# Patient Record
Sex: FEMALE | Race: WHITE | Employment: UNEMPLOYED | URBAN - METROPOLITAN AREA
[De-identification: names, ages, dates, MRNs, and addresses within clinical notes are randomized per-mention and may not be internally consistent; named-entity substitution may affect disease eponyms.]

---

## 2020-09-04 ENCOUNTER — HOSPITAL ENCOUNTER (OUTPATIENT)
Age: 60
Discharge: HOME | End: 2020-09-06

## 2020-09-04 PROCEDURE — 88304 TISSUE EXAM BY PATHOLOGIST: CPT

## 2020-09-04 PROCEDURE — 88311 DECALCIFY TISSUE: CPT

## 2021-01-17 NOTE — PROGRESS NOTES
[address] [facility] Cardiology consult  Dr. Joan Mitchell      Reason for Consult: Abnormal EKG  Referring Physician: Renee Parks DO     CHIEF COMPLAINT:   Chief Complaint   Patient presents with    Abnormal Test Results     new patient- abnormal ekg- pt has complaints of palpitations, sob       HISTORY OF PRESENT ILLNESS:   62year old female with history of TIA, palpitations, familial hypercholesterolemia with family history for premature coronary artery disease, is here for evaluation of an abnormal EKG. Patient denies any chest pain, no shortness of breath, no lightheadedness, no dizziness, no palpitations, no pedal edema, no PND, no orthopnea, no syncope, no presyncopal episodes.   Able to workout 30 minutes on a treadmill without any cardiac complaints    Past Medical History:   Diagnosis Date    Anxiety     Atypical chest pain     Blood transfusion     Chronic fatigue syndrome     Depression     Fibromyositis     Gall stones     H/O cardiovascular stress test 4/23/12    [facility]     Hiatal hernia     IBS (irritable bowel syndrome)     Mitral valve prolapse     Nausea & vomiting     Palpitations     Peptic ulcer     Schatzki's ring 2013    diganosed by dr Breanna James has been having endoscopies done for this    TIA (transient ischemic attack)     diagnosed in [address] [place] [address] Unspecified cerebral artery occlusion with cerebral infarction          Past Surgical History:   Procedure Laterality Date    APPENDECTOMY      ARTERY BIOPSY      left temporal BX 3/6/2012    BICEPS TENDON REPAIR  may 2012    left shoulder tendon repair by dr Amanda Morrow at [address] Bilateral aug 2013    diana breast augmentation    ENDOSCOPY, COLON, DIAGNOSTIC      HYSTERECTOMY  2008    partial hystecotomy    SHOULDER ARTHROSCOPY  may 2012    rotator cuff repair subacromial decompression and debridment    UPPER GASTROINTESTINAL ENDOSCOPY      esophageal stricture    UPPER GASTROINTESTINAL ENDOSCOPY  october 2012 activity     Days per week: Not on file     Minutes per session: Not on file    Stress: Not on file   Relationships    Social connections     Talks on phone: Not on file     Gets together: Not on file     Attends Oriental orthodox service: Not on file     Active member of club or organization: Not on file     Attends meetings of clubs or organizations: Not on file     Relationship status: Not on file    Intimate partner violence     Fear of current or ex partner: Not on file     Emotionally abused: Not on file     Physically abused: Not on file     Forced sexual activity: Not on file   Other Topics Concern    Not on file   Social History Narrative    Not on file       Family History   Problem Relation Age of Onset    Stroke Mother     Heart Disease Mother     High Cholesterol Mother     Heart Attack Mother     Cancer Father     Heart Disease Father     Heart Surgery Father     High Cholesterol Sister        REVIEW OF SYSTEMS:     CONSTITUTIONAL:  negative for  fevers, chills, sweats and fatigue  EYES:  negative for  double vision, blurred vision and blind spots  HEENT:  negative for  tinnitus, earaches, nasal congestion and epistaxis  RESPIRATORY:  negative for  dry cough, cough with sputum, wheezing and hemoptysis  CARDIOVASCULAR: as per HPI  GASTROINTESTINAL:  negative for nausea, vomiting, diarrhea, constipation, pruritus and jaundice  GENITOURINARY:  negative for frequency, dysuria, nocturia, urinary incontinence and hesitancy  HEMATOLOGIC/LYMPHATIC:  negative for easy bruising, bleeding, lymphadenopathy and petechiae  ALLERGIC/IMMUNOLOGIC:  negative for urticaria, hay fever and angioedema  ENDOCRINE:  negative for heat intolerance, cold intolerance, tremor, hair loss and diabetic symptoms including neither polyuria nor polydipsia nor blurred vision  MUSCULOSKELETAL:  negative for  myalgias, arthralgias, joint swelling, stiff joints and decreased range of motion  NEUROLOGICAL:  negative for memory problems, speech problems, visual disturbance, dysphagia, weakness and numbness      PHYSICAL EXAM:   CONSTITUTIONAL:  awake, alert, cooperative, no apparent distress, and appears stated age  EYES:  lids and lashes normal and pupils equal, round and reactive to light, anicteric sclerae  HEAD:  normocepalic, without obvious abnormality, atraumatic, pink, moist mucous membranes. NECK:  Supple, symmetrical, trachea midline, no adenopathy, thyroid symmetric, not enlarged and no tenderness, skin normal  HEMATOLOGIC/LYMPHATICS:  no cervical lymphadenopathy and no supraclavicular lymphadenopathy  LUNGS:  No increased work of breathing, good air exchange, clear to auscultation bilaterally, no crackles or wheezing  CARDIOVASCULAR:  Normal apical impulse, regular rate and rhythm, normal S1 and S2, no S3 or S4, and no murmur noted and no JVD, no carotid bruit, no pedal edema, good carotid upstroke bilaterally. ABDOMEN:  Soft, nontender, no masses, no hepatomegaly or splenomegaly, BS+  CHEST: nontender to palpation, expands symmetrically  MUSCULOSKELETAL:  No clubbing no cyanosis. there is no redness, warmth, or swelling of the joints  full range of motion noted  NEUROLOGIC:  Alert, awake,oriented x3, no focal neurologic deficit was appreciated  SKIN:  no bruising or bleeding, normal skin color, texture, turgor and no redness, warmth, or swelling        /80   Pulse 68   Resp 18   Ht 5' 1\" (1.549 m)   Wt 120 lb (54.4 kg)   BMI 22.67 kg/m²     DATA:   I personally reviewed the visit EKG with the following interpretation: Sinus rhythm, T wave changes in the septal leads    ECHO:   Stress Test:  Angiography:  Cardiology Labs: BMP:    Lab Results   Component Value Date     08/14/2013    K 4.4 08/14/2013     08/14/2013    CO2 33 08/14/2013    BUN 9 08/14/2013    CREATININE 0.9 08/14/2013     CMP:    Lab Results   Component Value Date     08/14/2013    K 4.4 08/14/2013     08/14/2013    CO2 33 08/14/2013 errors may be present

## 2021-01-20 ENCOUNTER — OFFICE VISIT (OUTPATIENT)
Dept: CARDIOLOGY CLINIC | Age: 61
End: 2021-01-20
Payer: OTHER GOVERNMENT

## 2021-01-20 VITALS
HEIGHT: 61 IN | BODY MASS INDEX: 22.66 KG/M2 | SYSTOLIC BLOOD PRESSURE: 134 MMHG | RESPIRATION RATE: 18 BRPM | HEART RATE: 68 BPM | WEIGHT: 120 LBS | DIASTOLIC BLOOD PRESSURE: 80 MMHG

## 2021-01-20 DIAGNOSIS — R94.31 ABNORMAL EKG: Primary | ICD-10-CM

## 2021-01-20 PROCEDURE — 93000 ELECTROCARDIOGRAM COMPLETE: CPT | Performed by: INTERNAL MEDICINE

## 2021-01-20 PROCEDURE — 99243 OFF/OP CNSLTJ NEW/EST LOW 30: CPT | Performed by: INTERNAL MEDICINE

## 2021-01-20 RX ORDER — MULTIVIT-MIN/IRON/FOLIC ACID/K 18-600-40
500 CAPSULE ORAL DAILY
COMMUNITY

## 2021-01-20 RX ORDER — MULTIVIT-MIN/IRON/FOLIC ACID/K 18-600-40
2000 CAPSULE ORAL DAILY
COMMUNITY

## 2021-01-20 RX ORDER — METOPROLOL SUCCINATE 25 MG/1
25 TABLET, EXTENDED RELEASE ORAL DAILY
COMMUNITY
Start: 2020-12-02

## 2022-01-10 ENCOUNTER — OFFICE VISIT (OUTPATIENT)
Dept: FAMILY MEDICINE CLINIC | Age: 62
End: 2022-01-10
Payer: OTHER GOVERNMENT

## 2022-01-10 VITALS
TEMPERATURE: 98.5 F | HEIGHT: 61 IN | SYSTOLIC BLOOD PRESSURE: 130 MMHG | BODY MASS INDEX: 22.47 KG/M2 | OXYGEN SATURATION: 99 % | WEIGHT: 119 LBS | RESPIRATION RATE: 18 BRPM | HEART RATE: 80 BPM | DIASTOLIC BLOOD PRESSURE: 78 MMHG

## 2022-01-10 DIAGNOSIS — E03.9 HYPOTHYROIDISM, UNSPECIFIED TYPE: ICD-10-CM

## 2022-01-10 DIAGNOSIS — Z12.31 BREAST CANCER SCREENING BY MAMMOGRAM: ICD-10-CM

## 2022-01-10 DIAGNOSIS — Z00.00 ROUTINE PHYSICAL EXAMINATION: Primary | ICD-10-CM

## 2022-01-10 DIAGNOSIS — Z28.21 VACCINATION NOT CARRIED OUT BECAUSE OF PATIENT REFUSAL: ICD-10-CM

## 2022-01-10 DIAGNOSIS — Z83.438 FAMILY HISTORY OF HYPERLIPIDEMIA: ICD-10-CM

## 2022-01-10 PROCEDURE — 99203 OFFICE O/P NEW LOW 30 MIN: CPT | Performed by: STUDENT IN AN ORGANIZED HEALTH CARE EDUCATION/TRAINING PROGRAM

## 2022-01-10 RX ORDER — FLUTICASONE PROPIONATE 50 MCG
2 SPRAY, SUSPENSION (ML) NASAL DAILY
COMMUNITY

## 2022-01-10 RX ORDER — CYANOCOBALAMIN 1000 UG/ML
INJECTION INTRAMUSCULAR; SUBCUTANEOUS
COMMUNITY
Start: 2021-11-04

## 2022-01-10 RX ORDER — LORAZEPAM 0.5 MG/1
0.5 TABLET ORAL 3 TIMES DAILY PRN
COMMUNITY

## 2022-01-10 SDOH — ECONOMIC STABILITY: FOOD INSECURITY: WITHIN THE PAST 12 MONTHS, THE FOOD YOU BOUGHT JUST DIDN'T LAST AND YOU DIDN'T HAVE MONEY TO GET MORE.: NEVER TRUE

## 2022-01-10 SDOH — ECONOMIC STABILITY: FOOD INSECURITY: WITHIN THE PAST 12 MONTHS, YOU WORRIED THAT YOUR FOOD WOULD RUN OUT BEFORE YOU GOT MONEY TO BUY MORE.: NEVER TRUE

## 2022-01-10 ASSESSMENT — ENCOUNTER SYMPTOMS
SINUS PRESSURE: 0
NAUSEA: 1
BACK PAIN: 0
COUGH: 0
SHORTNESS OF BREATH: 0
DIARRHEA: 0
RHINORRHEA: 0
VOMITING: 0
CONSTIPATION: 0
SINUS PAIN: 0
ABDOMINAL PAIN: 0
EYE PAIN: 0
EYE REDNESS: 0
SORE THROAT: 0

## 2022-01-10 ASSESSMENT — PATIENT HEALTH QUESTIONNAIRE - PHQ9
2. FEELING DOWN, DEPRESSED OR HOPELESS: 1
SUM OF ALL RESPONSES TO PHQ9 QUESTIONS 1 & 2: 2
SUM OF ALL RESPONSES TO PHQ QUESTIONS 1-9: 2
1. LITTLE INTEREST OR PLEASURE IN DOING THINGS: 1
SUM OF ALL RESPONSES TO PHQ QUESTIONS 1-9: 2

## 2022-01-10 ASSESSMENT — SOCIAL DETERMINANTS OF HEALTH (SDOH): HOW HARD IS IT FOR YOU TO PAY FOR THE VERY BASICS LIKE FOOD, HOUSING, MEDICAL CARE, AND HEATING?: NOT HARD AT ALL

## 2022-01-10 NOTE — PROGRESS NOTES
1/10/2022    Kam Gordillo (:  1960) is a 62 y.o. female, here for evaluation of the following medical concerns:    HPI  Chief Complaint   Patient presents with    New Patient     est pcp    Advice Only     Questions re: triazalom from dentist / Is it ok to take (from Dentist)     Patient is a 26-year-old female here today to establish care with myself. She has a past medical history of anxiety depression chronic fatigue fibromyositis history of gallstones hiatal hernia and bowel syndrome mitral valve prolapse nausea and vomiting palpitations peptic ulcers schatzis ring TIA and a cerebral artery occlusion. She is allergic to codeine. Surgical history includes appendectomy and artery biopsy a biceps tendon repair per surgery hysterectomy shoulder arthroscopy multiple upper GI scopes. Patient is getting dental work done on the . The dental center and triazolam however she is already on lorazepam.  She has had a refill of this since 2018 and barely takes it. She has many pills still left. Therefore she takes it very infrequently. She is also on tramadol but takes it due to her pain in her hands. Again she only takes this as needed and does not need it all the time. Discussed that we would send in 1 and if we need to in the future we can refer to pain management or psychiatry. Patient states that she always has nausea. She has headaches on and off from her left shoulder pain. She does have numbness in her hands and chronic hand pain. She always has depression anxiety. However she is sleeping well. She denies any suicidal homicidal thoughts. Patient does decline the flu shot today. She is going to go get her Covid shot in the future. She will ask her insurance about where she should get the shingles vaccine. Review of Systems   Constitutional: Negative for chills, fatigue and fever.    HENT: Negative for congestion, ear pain, postnasal drip, rhinorrhea, sinus pressure, sinus pain and sore throat. Eyes: Negative for pain and redness. Respiratory: Negative for cough and shortness of breath. Cardiovascular: Negative for chest pain. Gastrointestinal: Positive for nausea. Negative for abdominal pain, constipation, diarrhea and vomiting. Genitourinary: Negative for difficulty urinating and dysuria. Musculoskeletal: Positive for arthralgias (shoulder pain and hand pain). Negative for back pain, myalgias and neck pain. Skin: Negative for rash. Neurological: Positive for numbness (left hand) and headaches (on and off from shoulder). Negative for dizziness and light-headedness. Psychiatric/Behavioral: Positive for dysphoric mood. Negative for sleep disturbance and suicidal ideas. The patient is nervous/anxious. Prior to Visit Medications    Medication Sig Taking? Authorizing Provider   LORazepam (ATIVAN) 0.5 MG tablet Take 0.5 mg by mouth 3 times daily as needed for Anxiety. Yes Historical Provider, MD   ALBUTEROL SULFATE ER PO Take by mouth Yes Historical Provider, MD   fluticasone (FLONASE) 50 MCG/ACT nasal spray 2 sprays by Each Nostril route daily Yes Historical Provider, MD   cyanocobalamin 1000 MCG/ML injection INJECT 1 ML INTO THE MUSCLE EVERY MONTH Yes Historical Provider, MD   metoprolol succinate (TOPROL XL) 25 MG extended release tablet 25 mg daily  Yes Historical Provider, MD   Cholecalciferol (VITAMIN D) 50 MCG (2000 UT) CAPS capsule Take 2,000 Units by mouth daily Yes Historical Provider, MD   Ascorbic Acid (VITAMIN C) 500 MG CAPS Take 500 mg by mouth daily Yes Historical Provider, MD   traMADol (ULTRAM) 50 MG tablet Take 50 mg by mouth every 6 hours as needed for Pain . Yes Historical Provider, MD   aspirin 81 MG tablet Take 81 mg by mouth daily. Yes Historical Provider, MD   Calcium-Magnesium 500-250 MG TABS Take 1 tablet by mouth daily.  1000mg/400 mg/zinc 15 mg Yes Historical Provider, MD   Bisacodyl (DULCOLAX PO) Take 1 tablet by mouth as needed. Yes Historical Provider, MD   therapeutic multivitamin-minerals (THERAGRAN-M) tablet Take 1 tablet by mouth daily. Yes Historical Provider, MD   Omeprazole (PRILOSEC PO) Take 20 mg by mouth daily. At noon Yes Historical Provider, MD        Allergies   Allergen Reactions    Codeine      dizziness       Past Medical History:   Diagnosis Date    Anxiety     Atypical chest pain     Blood transfusion     Bronchitis     chronic    Chronic fatigue syndrome     Depression     Fibromyositis     Gall stones     H/O cardiovascular stress test 4/23/12    [facility]     Hiatal hernia     IBS (irritable bowel syndrome)     Mitral valve prolapse     Nausea & vomiting     Palpitations     Peptic ulcer     Schatzki's ring 2013    diganosed by dr Mary Ann Bynum has been having endoscopies done for this    TIA (transient ischemic attack)     diagnosed in [address] [place] [address] cerebral artery occlusion with cerebral infarction        Past Surgical History:   Procedure Laterality Date    APPENDECTOMY      ARTERY BIOPSY      left temporal BX 3/6/2012    BICEPS TENDON REPAIR  may 2012    [facility] [address] [facility] 2013    diana breast augmentation    ENDOSCOPY, COLON, DIAGNOSTIC      HYSTERECTOMY  2008    partial hystecotomy    SHOULDER ARTHROSCOPY  may 2012    rotator cuff repair subacromial decompression and debridment    UPPER GASTROINTESTINAL ENDOSCOPY      esophageal stricture    UPPER GASTROINTESTINAL ENDOSCOPY  october 2012    x2 by dr Mary Ann Bynum due to questionable \"blood clot\" in throat.  Biopsy was performed patient is not seeing him again until December    UPPER GASTROINTESTINAL ENDOSCOPY         Social History     Socioeconomic History    Marital status:      Spouse name: Not on file    Number of children: Not on file    Years of education: Not on file    Highest education level: Not on file   Occupational History    Not on file   Tobacco Use    Smoking status: Never Smoker    Smokeless tobacco: Never Used   Vaping Use    Vaping Use: Never used   Substance and Sexual Activity    Alcohol use: No    Drug use: No    Sexual activity: Yes     Partners: Male   Other Topics Concern    Not on file   Social History Narrative    Not on file     Social Determinants of Health     Financial Resource Strain: Low Risk     Difficulty of Paying Living Expenses: Not hard at all   Food Insecurity: No Food Insecurity    Worried About [address] in the Last Year: Never true    [facility] in the Last Year: Never true   Transportation Needs:     Lack of Transportation (Medical): Not on file    Lack of Transportation (Non-Medical):  Not on file   Physical Activity:     Days of Exercise per Week: Not on file    Minutes of Exercise per Session: Not on file   Stress:     Feeling of Stress : Not on file   Social Connections:     Frequency of Communication with Friends and Family: Not on file    Frequency of Social Gatherings with Friends and Family: Not on file    Attends Bahai Services: Not on file    Active Member of [address] [place] [address] or Organizations: Not on file    Attends Club or Organization Meetings: Not on file    Marital Status: Not on file   Intimate Partner Violence:     Fear of Current or Ex-Partner: Not on file    Emotionally Abused: Not on file    Physically Abused: Not on file    Sexually Abused: Not on file   Housing Stability:     Unable to Pay for Housing in the Last Year: Not on file    Number of [place] in the Last Year: Not on file    Unstable Housing in the Last Year: Not on file        Family History   Problem Relation Age of Onset    Stroke Mother     Heart Disease Mother     High Cholesterol Mother     Heart Attack Mother     Cancer Father     Heart Disease Father     Heart Surgery Father     High Cholesterol Sister            Vitals:    01/10/22 0924   BP: 130/78   Pulse: 80   Resp: 18 Temp: 98.5 °F (36.9 °C)   TempSrc: Temporal   SpO2: 99%   Weight: 119 lb (54 kg)   Height: 5' 1\" (1.549 m)     Estimated body mass index is 22.48 kg/m² as calculated from the following:    Height as of this encounter: 5' 1\" (1.549 m). Weight as of this encounter: 119 lb (54 kg).     Most Recent Labs  CBC  Lab Results   Component Value Date    WBC 4.7 08/14/2013    WBC 5.9 09/24/2012    WBC 5.2 05/31/2012    RBC 4.17 08/14/2013    RBC 4.05 09/24/2012    RBC 3.92 05/31/2012    HGB 12.7 08/14/2013    HGB 12.6 09/24/2012    HGB 12.4 05/31/2012    HGB 11.0 05/03/2012    HGB 12.7 04/26/2012    HCT 36.6 08/14/2013    HCT 36.2 09/24/2012    HCT 35.0 05/31/2012    HCT 31.9 05/03/2012    HCT 36.5 04/26/2012    MCV 87.7 08/14/2013    MCV 89.4 09/24/2012    MCV 89.4 05/31/2012     08/14/2013     09/24/2012     05/31/2012      CMP  Lab Results   Component Value Date     08/14/2013     09/24/2012     05/31/2012    K 4.4 08/14/2013    K 4.6 09/24/2012    K 4.6 05/31/2012     08/14/2013     09/24/2012     05/31/2012    CO2 33 08/14/2013    CO2 32 09/24/2012    CO2 31 05/31/2012    GLUCOSE 104 08/14/2013    GLUCOSE 73 09/24/2012    GLUCOSE 83 05/31/2012    GLUCOSE 104 05/03/2012    GLUCOSE 91 04/26/2012    BUN 9 08/14/2013    BUN 10 09/24/2012    BUN 8 05/31/2012    BUN 8 05/03/2012    BUN 11 04/26/2012    CREATININE 0.9 08/14/2013    CREATININE 0.8 09/24/2012    CREATININE 0.8 05/31/2012    CREATININE 0.7 05/03/2012    CREATININE 0.7 04/26/2012    LABGLOM >60 08/14/2013    LABGLOM >60 09/24/2012    LABGLOM >60 05/31/2012    CALCIUM 10.0 08/14/2013    CALCIUM 9.8 09/24/2012    CALCIUM 9.8 05/31/2012    PROT 7.3 09/24/2012    PROT 7.3 05/31/2012    PROT 7.9 01/18/2012    LABALBU 4.6 09/24/2012    LABALBU 4.5 05/31/2012    LABALBU 4.6 01/18/2012    LABALBU 4.4 09/08/2011    BILITOT 0.4 09/24/2012    BILITOT 0.4 05/31/2012    BILITOT 0.6 01/18/2012    BILITOT 0.6 09/08/2011 warm and dry. Capillary Refill: Capillary refill takes less than 2 seconds. Neurological:      General: No focal deficit present. Mental Status: She is alert and oriented to person, place, and time. Psychiatric:         Mood and Affect: Mood normal.         Behavior: Behavior normal.         Thought Content: Thought content normal.         ASSESSMENT/PLAN:  Juan Flores was seen today for new patient and advice only. Diagnoses and all orders for this visit:    Routine physical examination  -     CBC Auto Differential; Future  -     Comprehensive Metabolic Panel; Future  -     Hemoglobin A1C; Future  -     TSH without Reflex; Future  -     Lipid Panel; Future    Hypothyroidism, unspecified type  -     TSH without Reflex; Future    Family history of hyperlipidemia  -     Lipid Panel; Future    Breast cancer screening by mammogram  -     BALDEMAR NATHAN DIGITAL SCREEN BILATERAL; Future    Vaccination not carried out because of patient refusal       Patient declined flu shot  Patient states that she is up-to-date on her colonoscopies and follows with a GI physician. Told her to let us get her records so we can update her in the chart. As above. Call or go to the nearest ED immediately if symptoms worsen or persist.  Educational materials and/or home exercises printed for patient's review and were included in patient instructions on his/her After Visit Summary and given to patient at the end of visit. Counseled regarding above diagnosis, including possible risks and complications,  especially if left uncontrolled. Counseled regarding the possible side effects, risks, benefits and alternatives to treatment; patient and/or guardian verbalizes understanding, agrees, feels comfortable with and wishes to proceed with above treatment plan.      Advised patient to call with any new medication issues, and read all Rx info from pharmacy to assure aware of all possible risks and side effects of medication before taking. Reviewed age and gender appropriate health screening exams and vaccinations. Advised patient regarding importance of keeping up with recommended health maintenance and to schedule as soon as possible if overdue, as this is important in assessing for undiagnosed pathology, especially cancer, as well as protecting against potentially harmful/life threatening disease. Patient and/or guardian verbalizes understanding and agrees with above counseling, assessment and plan. All questions answered. Return in about 4 weeks (around 2/7/2022) for med refill. An  electronic signature was used to authenticate this note.     --Magdiel Castellano,  on 1/10/2022 at 9:49 AM

## 2022-06-20 NOTE — PROGRESS NOTES
[facility] Cardiology consult  Dr. Soundra Opitz      Reason for Consult: Abnormal EKG  Referring Physician: Vern Zamora DO     CHIEF COMPLAINT:   Chief Complaint   Patient presents with    Heart Problem     Annual, patient has been having chest pains on and off       HISTORY OF PRESENT ILLNESS:   62year old female with history of TIA, palpitations, familial hypercholesterolemia with family history for premature coronary artery disease, is here for evaluation of an abnormal EKG. Chest pain, left-sided, sharp in nature, comes and goes, nonexertional, occasional palpitations, no shortness of breath, no lightheadedness, no dizziness, no pedal edema, no PND, no orthopnea, no syncope, no presyncopal episodes.   Functional capacity is at baseline    Past Medical History:   Diagnosis Date    Anxiety     Atypical chest pain     Blood transfusion     Bronchitis     chronic    Chronic fatigue syndrome     Depression     Fibromyositis     Gall stones     H/O cardiovascular stress test 4/23/12    [facility]     Hiatal hernia     IBS (irritable bowel syndrome)     Mitral valve prolapse     Nausea & vomiting     Palpitations     Peptic ulcer     Schatzki's ring 2013    diganosed by dr Shilo Marie has been having endoscopies done for this    TIA (transient ischemic attack)     diagnosed in [address] [place] [address]- cerebral artery occlusion with cerebral infarction          Past Surgical History:   Procedure Laterality Date    APPENDECTOMY      ARTERY BIOPSY      left temporal BX 3/6/2012    BICEPS TENDON REPAIR  may 2012    left shoulder tendon repair by dr Elisa Ac[facility] aug 2013    diana breast augmentation    ENDOSCOPY, COLON, DIAGNOSTIC      HYSTERECTOMY (CERVIX STATUS UNKNOWN)  2008    partial hystecotomy    SHOULDER ARTHROSCOPY  may 2012    rotator cuff repair subacromial decompression and debridment    UPPER GASTROINTESTINAL ENDOSCOPY      esophageal stricture    UPPER GASTROINTESTINAL ENDOSCOPY  october 2012    x2 by dr Monica Dickerson due to questionable \"blood clot\" in throat. Biopsy was performed patient is not seeing him again until December    UPPER GASTROINTESTINAL ENDOSCOPY           Current Outpatient Medications   Medication Sig Dispense Refill    LORazepam (ATIVAN) 0.5 MG tablet Take 0.5 mg by mouth 3 times daily as needed for Anxiety.  ALBUTEROL SULFATE ER PO Take by mouth      fluticasone (FLONASE) 50 MCG/ACT nasal spray 2 sprays by Each Nostril route daily      cyanocobalamin 1000 MCG/ML injection INJECT 1 ML INTO THE MUSCLE EVERY MONTH      metoprolol succinate (TOPROL XL) 25 MG extended release tablet 25 mg daily       Cholecalciferol (VITAMIN D) 50 MCG (2000 UT) CAPS capsule Take 2,000 Units by mouth daily      Ascorbic Acid (VITAMIN C) 500 MG CAPS Take 500 mg by mouth daily      traMADol (ULTRAM) 50 MG tablet Take 50 mg by mouth every 6 hours as needed for Pain .  aspirin 81 MG tablet Take 81 mg by mouth daily.  Calcium-Magnesium 500-250 MG TABS Take 1 tablet by mouth daily. 1000mg/400 mg/zinc 15 mg      Bisacodyl (DULCOLAX PO) Take 1 tablet by mouth as needed.  therapeutic multivitamin-minerals (THERAGRAN-M) tablet Take 1 tablet by mouth daily.  Omeprazole (PRILOSEC PO) Take 40 mg by mouth daily At noon       No current facility-administered medications for this visit.          Allergies as of 06/21/2022 - Fully Reviewed 01/10/2022   Allergen Reaction Noted    Codeine  02/22/2012       Social History     Socioeconomic History    Marital status:      Spouse name: Not on file    Number of children: Not on file    Years of education: Not on file    Highest education level: Not on file   Occupational History    Not on file   Tobacco Use    Smoking status: Never Smoker    Smokeless tobacco: Never Used   Vaping Use    Vaping Use: Never used   Substance and Sexual Activity    Alcohol use: No    Drug use: No    Sexual activity: Yes     Partners: Male   Other Topics Concern    Not on file   Social History Narrative    Not on file     Social Determinants of Health     Financial Resource Strain: Low Risk     Difficulty of Paying Living Expenses: Not hard at all   Food Insecurity: No Food Insecurity    Worried About Running Out of Food in the Last Year: Never true    [address] in the Last Year: Never true   Transportation Needs:     Lack of Transportation (Medical): Not on file    Lack of Transportation (Non-Medical):  Not on file   Physical Activity:     Days of Exercise per Week: Not on file    Minutes of Exercise per Session: Not on file   Stress:     Feeling of Stress : Not on file   Social Connections:     Frequency of Communication with Friends and Family: Not on file    Frequency of Social Gatherings with Friends and Family: Not on file    Attends Gnosticist Services: Not on file    Active Member of [address] [place] [address] [employer] or Organizations: Not on file    Attends Club or Organization Meetings: Not on file    Marital Status: Not on file   Intimate Partner Violence:     Fear of Current or Ex-Partner: Not on file    Emotionally Abused: Not on file    Physically Abused: Not on file    Sexually Abused: Not on file   Housing Stability:     Unable to Pay for Housing in the Last Year: Not on file    Number of [place] in the Last Year: Not on file    Unstable Housing in the Last Year: Not on file       Family History   Problem Relation Age of Onset    Stroke Mother     Heart Disease Mother     High Cholesterol Mother     Heart Attack Mother     Cancer Father     Heart Disease Father     Heart Surgery Father     High Cholesterol Sister        REVIEW OF SYSTEMS:     CONSTITUTIONAL:  negative for  fevers, chills, sweats and fatigue  HEENT:  negative for  tinnitus, earaches, nasal congestion and epistaxis  RESPIRATORY:  negative for  dry cough, cough with sputum, wheezing and hemoptysis  GASTROINTESTINAL: negative for nausea, vomiting, diarrhea, constipation, pruritus and jaundice  HEMATOLOGIC/LYMPHATIC:  negative for easy bruising, bleeding, lymphadenopathy and petechiae  ENDOCRINE:  negative for heat intolerance, cold intolerance, tremor, hair loss and diabetic symptoms including neither polyuria nor polydipsia nor blurred vision  MUSCULOSKELETAL:  negative for  myalgias, arthralgias, joint swelling, stiff joints and decreased range of motion  NEUROLOGICAL:  negative for memory problems, speech problems, visual disturbance, dysphagia, weakness and numbness      PHYSICAL EXAM:   CONSTITUTIONAL:  awake, alert, cooperative, no apparent distress, and appears stated age  HEAD:  normocepalic, without obvious abnormality, atraumatic, pink, moist mucous membranes. NECK:  Supple, symmetrical, trachea midline, no adenopathy, thyroid symmetric, not enlarged and no tenderness, skin normal  HEMATOLOGIC/LYMPHATICS:  no cervical lymphadenopathy and no supraclavicular lymphadenopathy  LUNGS:  No increased work of breathing, good air exchange, clear to auscultation bilaterally, no crackles or wheezing  CARDIOVASCULAR:  Normal apical impulse, regular rate and rhythm, normal S1 and S2, no S3 or S4, and no murmur noted and no JVD, no carotid bruit, no pedal edema, good carotid upstroke bilaterally. ABDOMEN:  Soft, nontender, no masses, no hepatomegaly or splenomegaly, BS+  CHEST: nontender to palpation, expands symmetrically  MUSCULOSKELETAL:  No clubbing no cyanosis. there is no redness, warmth, or swelling of the joints  full range of motion noted  NEUROLOGIC:  Alert, awake,oriented x3  SKIN:  no bruising or bleeding, normal skin color, texture, turgor and no redness, warmth, or swelling        /80   Pulse 70   Resp 16   Ht 5' 1\" (1.549 m)   Wt 121 lb (54.9 kg)   SpO2 96%   BMI 22.86 kg/m²     DATA:   I personally reviewed the visit EKG with the following interpretation: Sinus rhythm, nonspecific septal T wave changes, normal EKG    EKG 1/20/21 Sinus rhythm, T wave changes in the septal leads    ECHO:   Stress Test:  Angiography:  Cardiology Labs: BMP:    Lab Results   Component Value Date     08/14/2013    K 4.4 08/14/2013     08/14/2013    CO2 33 08/14/2013    BUN 9 08/14/2013    CREATININE 0.9 08/14/2013     CMP:    Lab Results   Component Value Date     08/14/2013    K 4.4 08/14/2013     08/14/2013    CO2 33 08/14/2013    BUN 9 08/14/2013    CREATININE 0.9 08/14/2013    PROT 7.3 09/24/2012     CBC:    Lab Results   Component Value Date    WBC 4.7 08/14/2013    RBC 4.17 08/14/2013    HGB 12.7 08/14/2013    HCT 36.6 08/14/2013    MCV 87.7 08/14/2013    RDW 13.4 08/14/2013     08/14/2013     PT/INR:  No results found for: PTINR  PT/INR Warfarin:  No components found for: PTPATWAR, PTINRWAR  PTT:  No results found for: APTT  PTT Heparin:  No components found for: APTTHEP  Magnesium:  No results found for: MG  TSH:    Lab Results   Component Value Date    TSH 3.057 05/31/2012     TROPONIN:  No components found for: TROP  BNP:  No results found for: BNP  FASTING LIPID PANEL:    Lab Results   Component Value Date    CHOL 237 01/18/2012    HDL 81.0 01/18/2012    TRIG 110 01/18/2012     No orders to display     I have personally reviewed the laboratory, cardiac diagnostic and radiographic testing as outlined above:        IMPRESSION:  1. Abnormal EKG: Asymptomatic, chronic changes  2. History of PVCs: Better on metoprolol  3. Familial hypercholesterolemia  4. Family history for early CAD      RECOMMENDATIONS:   1. Continue current treatment  2. Preventive cardiology: Low-salt, low-cholesterol diet, daily exercise,were all advised. 4.  Follow-up with Dr. Rosalio Rios as scheduled  5.   Follow-up with Dr. Anabella Camp in 1 year, sooner if symptomatic for any reason    I have reviewed my findings and recommendations with patient    Electronically signed by Debi Perez MD on 6/21/2022 at 10:01 AM      NOTE: This report was transcribed using voice recognition software.  Every effort was made to ensure accuracy; however, inadvertent computerized transcription errors may be present

## 2022-06-21 ENCOUNTER — OFFICE VISIT (OUTPATIENT)
Dept: CARDIOLOGY CLINIC | Age: 62
End: 2022-06-21
Payer: OTHER GOVERNMENT

## 2022-06-21 VITALS
HEART RATE: 70 BPM | OXYGEN SATURATION: 96 % | DIASTOLIC BLOOD PRESSURE: 80 MMHG | BODY MASS INDEX: 22.84 KG/M2 | RESPIRATION RATE: 16 BRPM | SYSTOLIC BLOOD PRESSURE: 134 MMHG | HEIGHT: 61 IN | WEIGHT: 121 LBS

## 2022-06-21 DIAGNOSIS — R94.31 ABNORMAL EKG: Primary | ICD-10-CM

## 2022-06-21 PROCEDURE — 99213 OFFICE O/P EST LOW 20 MIN: CPT | Performed by: INTERNAL MEDICINE

## 2022-06-21 PROCEDURE — 93000 ELECTROCARDIOGRAM COMPLETE: CPT | Performed by: INTERNAL MEDICINE

## 2022-09-26 ENCOUNTER — HOSPITAL ENCOUNTER (EMERGENCY)
Age: 62
Discharge: HOME | End: 2022-09-26
Payer: OTHER GOVERNMENT

## 2022-09-26 ENCOUNTER — APPOINTMENT (OUTPATIENT)
Dept: GENERAL RADIOLOGY | Age: 62
End: 2022-09-26
Payer: OTHER GOVERNMENT

## 2022-09-26 VITALS
HEIGHT: 61 IN | WEIGHT: 115 LBS | BODY MASS INDEX: 21.71 KG/M2 | RESPIRATION RATE: 18 BRPM | DIASTOLIC BLOOD PRESSURE: 80 MMHG | SYSTOLIC BLOOD PRESSURE: 147 MMHG | OXYGEN SATURATION: 99 % | HEART RATE: 77 BPM | TEMPERATURE: 98.7 F

## 2022-09-26 DIAGNOSIS — S46.912A ELBOW STRAIN, LEFT, INITIAL ENCOUNTER: Primary | ICD-10-CM

## 2022-09-26 PROCEDURE — 99211 OFF/OP EST MAY X REQ PHY/QHP: CPT

## 2022-09-26 PROCEDURE — 73070 X-RAY EXAM OF ELBOW: CPT

## 2022-09-26 ASSESSMENT — PAIN DESCRIPTION - DESCRIPTORS: DESCRIPTORS: THROBBING;DISCOMFORT;TENDER

## 2022-09-26 ASSESSMENT — PAIN DESCRIPTION - ORIENTATION: ORIENTATION: LEFT

## 2022-09-26 ASSESSMENT — PAIN SCALES - GENERAL: PAINLEVEL_OUTOF10: 8

## 2022-09-26 ASSESSMENT — PAIN - FUNCTIONAL ASSESSMENT: PAIN_FUNCTIONAL_ASSESSMENT: 0-10

## 2022-09-26 ASSESSMENT — PAIN DESCRIPTION - LOCATION: LOCATION: ARM

## 2022-09-26 NOTE — ED PROVIDER NOTES
[address] [facility]  Department of Emergency Medicine  UC Encounter Note  22   3:44 PM EDT      NAME: Noman Gordillo  :  1960  MRN:  [id number]    Chief Complaint: Arm Pain (Left arm was pulled by door yesterday and she heard a \"pop\" )      Is a 42-year-old female the presents to urgent care complaining of a left elbow injury. Patient states she felt like her elbow popped when she was opening a door. She does state there is no wrist or shoulder pain at this time. No numbness or tingling. On first contact patient she appears to be in no acute distress. Review of Systems  Pertinent positives and negatives are stated within HPI, all other systems reviewed and are negative. Physical Exam  Vitals and nursing note reviewed. Constitutional:       Appearance: She is well-developed. HENT:      Head: Normocephalic and atraumatic. Right Ear: Hearing and external ear normal.      Left Ear: Hearing and external ear normal.      Nose: Nose normal.      Mouth/Throat:      Pharynx: Uvula midline. Eyes:      General: Lids are normal.      Conjunctiva/sclera: Conjunctivae normal.      Pupils: Pupils are equal, round, and reactive to light. Cardiovascular:      Rate and Rhythm: Normal rate and regular rhythm. Heart sounds: Normal heart sounds. No murmur heard. Pulmonary:      Effort: Pulmonary effort is normal.      Breath sounds: Normal breath sounds. Abdominal:      General: Bowel sounds are normal.      Palpations: Abdomen is soft. Abdomen is not rigid. Tenderness: There is no abdominal tenderness. There is no guarding or rebound. Musculoskeletal:      Cervical back: Normal range of motion and neck supple. Comments: Left elbow is not swollen. No obvious deformity but with certain range of motion especially with flexing and extending the elbow she does have pain at the elbow. No open area no redness no cyanosis. Has palpable radial pulse.   No wrist or shoulder pain. Skin:     General: Skin is warm and dry. Findings: No abrasion or rash. Neurological:      General: No focal deficit present. Mental Status: She is alert and oriented to person, place, and time. GCS: GCS eye subscore is 4. GCS verbal subscore is 5. GCS motor subscore is 6. Cranial Nerves: No cranial nerve deficit. Sensory: No sensory deficit. Coordination: Coordination normal.      Gait: Gait normal.       Procedures    MDM  Number of Diagnoses or Management Options  Elbow strain, left, initial encounter  Diagnosis management comments: Patient is in no acute distress. Treat as elbow strain. Ace wrap conservative treatment instructions given.             --------------------------------------------- PAST HISTORY ---------------------------------------------  Past Medical History:  has a past medical history of Anxiety, Atypical chest pain, Blood transfusion, Bronchitis, Chronic fatigue syndrome, Depression, Fibromyositis, Gall stones, H/O cardiovascular stress test, Hiatal hernia, IBS (irritable bowel syndrome), Mitral valve prolapse, Nausea & vomiting, Palpitations, Peptic ulcer, Schatzki's ring, TIA (transient ischemic attack), and Unspecified cerebral artery occlusion with cerebral infarction. Past Surgical History:  has a past surgical history that includes Hysterectomy (2008); Upper gastrointestinal endoscopy; Artery Biopsy; Shoulder arthroscopy (may 2012); Biceps tendon repair (may 2012); Upper gastrointestinal endoscopy (october 2012); Upper gastrointestinal endoscopy; Endoscopy, colon, diagnostic; Breast surgery (Bilateral, aug 2013); and Appendectomy. Social History:  reports that she has never smoked. She has never used smokeless tobacco. She reports that she does not drink alcohol and does not use drugs.     Family History: family history includes Cancer in her father; Heart Attack in her mother; Heart Disease in her father and mother; Heart Surgery in her father; High Cholesterol in her mother and sister; Stroke in her mother. The patients home medications have been reviewed. Allergies: Codeine    -------------------------------------------------- RESULTS -------------------------------------------------  No results found for this visit on 09/26/22. XR ELBOW LEFT (2 VIEWS)   Final Result   No acute abnormality.             ------------------------- NURSING NOTES AND VITALS REVIEWED ---------------------------   The nursing notes within the ED encounter and vital signs as below have been reviewed. BP (!) 147/80   Pulse 77   Temp 98.7 °F (37.1 °C) (Infrared)   Resp 18   Ht 5' 1\" (1.549 m)   Wt 115 lb (52.2 kg)   SpO2 99%   BMI 21.73 kg/m²   Oxygen Saturation Interpretation: Normal      ------------------------------------------ PROGRESS NOTES ------------------------------------------   I have spoken with the patient and discussed todays results, in addition to providing specific details for the plan of care and counseling regarding the diagnosis and prognosis. Their questions are answered at this time and they are agreeable with the plan.      --------------------------------- ADDITIONAL PROVIDER NOTES ---------------------------------     This patient is stable for discharge. I have shared the specific conditions for return, as well as the importance of follow-up. * NOTE: This report was transcribed using voice recognition software.  Every effort was made to ensure accuracy; however, inadvertent computerized transcription errors may be present.    --------------------------------- IMPRESSION AND DISPOSITION ---------------------------------    IMPRESSION  1. Elbow strain, left, initial encounter        DISPOSITION  Disposition: Discharge to home  Patient condition is good        Beto Sanz PA-C  09/26/22 [id number]

## 2023-01-20 DIAGNOSIS — M25.561 RIGHT KNEE PAIN, UNSPECIFIED CHRONICITY: Primary | ICD-10-CM

## 2023-01-24 ENCOUNTER — OFFICE VISIT (OUTPATIENT)
Dept: ORTHOPEDIC SURGERY | Age: 63
End: 2023-01-24
Payer: OTHER GOVERNMENT

## 2023-01-24 VITALS — WEIGHT: 110 LBS | HEIGHT: 61 IN | TEMPERATURE: 98 F | BODY MASS INDEX: 20.77 KG/M2

## 2023-01-24 DIAGNOSIS — S83.91XA SPRAIN OF RIGHT KNEE, UNSPECIFIED LIGAMENT, INITIAL ENCOUNTER: ICD-10-CM

## 2023-01-24 DIAGNOSIS — M48.061 SPINAL STENOSIS OF LUMBAR REGION, UNSPECIFIED WHETHER NEUROGENIC CLAUDICATION PRESENT: Primary | ICD-10-CM

## 2023-01-24 PROCEDURE — 99204 OFFICE O/P NEW MOD 45 MIN: CPT | Performed by: ORTHOPAEDIC SURGERY

## 2023-01-24 RX ORDER — METHYLPREDNISOLONE 4 MG/1
4 TABLET ORAL SEE ADMIN INSTRUCTIONS
Qty: 1 KIT | Refills: 0 | Status: SHIPPED | OUTPATIENT
Start: 2023-01-24 | End: 2023-01-30

## 2023-01-24 NOTE — PROGRESS NOTES
Chief Complaint   Patient presents with    Knee Pain     Est patient from 2013 new problem. Right knee pain at night. She also states she has shooting pain from her hip to her foot       Subjective:     Patient ID: Madeline Pineda is a 65 y.o..  female    Knee Pain  Patient complains of right knee pain. This is evaluated as a personal injury. There was a history of injury. She hurt her righ tfoot and it triggered the knee to hurt as well  The pain began 1 month ago. The pain is located popliteral. She describes  Her symptoms as aching. She has experienced popping, clicking, locking, and giving way in the affected knee. The patient has had pain with kneeling, squating, and climbing stairs. Symptoms improve with rest. The symptoms are worse with sleep. The knee has not given out or felt unstable. The patient can bend and straighten the knee fully.   She is complaining of lateral hip pain that radiates down her leg    Past Medical History:   Diagnosis Date    Anxiety     Atypical chest pain     Blood transfusion     Bronchitis     chronic    Chronic fatigue syndrome     Depression     Fibromyositis     Gall stones     H/O cardiovascular stress test 4/23/12    OHI     Hiatal hernia     IBS (irritable bowel syndrome)     Mitral valve prolapse     Nausea & vomiting     Palpitations     Peptic ulcer     Schatzki's ring 2013    diganosed by dr Dickson Ho has been having endoscopies done for this    TIA (transient ischemic attack)     diagnosed in [facility] [address]    Unspecified cerebral artery occlusion with cerebral infarction      Past Surgical History:   Procedure Laterality Date    APPENDECTOMY      ARTERY BIOPSY      left temporal BX 3/6/2012    BICEPS TENDON REPAIR  may 2012    left shoulder tendon repair by dr Napoleon Romano at [facility]    BREAST SURGERY Bilateral aug 2013    diana breast augmentation    ENDOSCOPY, COLON, DIAGNOSTIC      HYSTERECTOMY (CERVIX STATUS UNKNOWN)  2008    partial hystecotomy SHOULDER ARTHROSCOPY  may 2012    rotator cuff repair subacromial decompression and debridment    UPPER GASTROINTESTINAL ENDOSCOPY      esophageal stricture    UPPER GASTROINTESTINAL ENDOSCOPY  october 2012    x2 by dr Alex Barrera due to questionable \"blood clot\" in throat. Biopsy was performed patient is not seeing him again until December    UPPER GASTROINTESTINAL ENDOSCOPY         Current Outpatient Medications:     methylPREDNISolone (MEDROL, ARCADIO,) 4 MG tablet, Take 1 tablet by mouth See Admin Instructions for 6 days Take by mouth., Disp: 1 kit, Rfl: 0    LORazepam (ATIVAN) 0.5 MG tablet, Take 0.5 mg by mouth 3 times daily as needed for Anxiety. , Disp: , Rfl:     ALBUTEROL SULFATE ER PO, Take by mouth, Disp: , Rfl:     fluticasone (FLONASE) 50 MCG/ACT nasal spray, 2 sprays by Each Nostril route daily, Disp: , Rfl:     cyanocobalamin 1000 MCG/ML injection, INJECT 1 ML INTO THE MUSCLE EVERY MONTH, Disp: , Rfl:     metoprolol succinate (TOPROL XL) 25 MG extended release tablet, 25 mg daily , Disp: , Rfl:     Cholecalciferol (VITAMIN D) 50 MCG (2000 UT) CAPS capsule, Take 2,000 Units by mouth daily, Disp: , Rfl:     Ascorbic Acid (VITAMIN C) 500 MG CAPS, Take 500 mg by mouth daily, Disp: , Rfl:     traMADol (ULTRAM) 50 MG tablet, Take 50 mg by mouth every 6 hours as needed for Pain ., Disp: , Rfl:     aspirin 81 MG tablet, Take 81 mg by mouth daily. , Disp: , Rfl:     Calcium-Magnesium 500-250 MG TABS, Take 1 tablet by mouth daily. 1000mg/400 mg/zinc 15 mg, Disp: , Rfl:     therapeutic multivitamin-minerals (THERAGRAN-M) tablet, Take 1 tablet by mouth daily. , Disp: , Rfl:     Omeprazole (PRILOSEC PO), Take 40 mg by mouth daily At noon, Disp: , Rfl:     Bisacodyl (DULCOLAX PO), Take 1 tablet by mouth as needed. (Patient not taking: No sig reported), Disp: , Rfl:   Allergies   Allergen Reactions    Codeine      dizziness    Other      Rapid heat rate after numbing medication given with dental work.      Social History Socioeconomic History    Marital status:      Spouse name: Not on file    Number of children: Not on file    Years of education: Not on file    Highest education level: Not on file   Occupational History    Not on file   Tobacco Use    Smoking status: Never    Smokeless tobacco: Never   Vaping Use    Vaping Use: Never used   Substance and Sexual Activity    Alcohol use: No    Drug use: No    Sexual activity: Yes     Partners: Male   Other Topics Concern    Not on file   Social History Narrative    Not on file     Social Determinants of Health     Financial Resource Strain: Not on file   Food Insecurity: Not on file   Transportation Needs: Not on file   Physical Activity: Not on file   Stress: Not on file   Social Connections: Not on file   Intimate Partner Violence: Not on file   Housing Stability: Not on file     Family History   Problem Relation Age of Onset    Stroke Mother     Heart Disease Mother     High Cholesterol Mother     Heart Attack Mother     Cancer Father     Heart Disease Father     Heart Surgery Father     High Cholesterol Sister          REVIEW OF SYSTEMS:     General/Constitution:  (-)weight loss, (-)fever, (-)chills, (-)weakness. Skin: (-) rash,(-) psoriasis,(-) eczema, (-)skin cancer. Musculoskeletal: (-) fractures,  (-) dislocations,(-) collagen vascular disease, (-) fibromyalgia, (-) multiple sclerosis, (-) muscular dystrophy, (-) RSD,(-) joint pain (-)swelling, (-) joint pain,swelling. Neurologic: (-) epilepsy, (-)seizures,(-) brain tumor,(-) TIA, (-)stroke, (-)headaches, (-)Parkinson disease,(-) memory loss, (-) LOC. Cardiovascular: (-) Chest pain, (-) swelling in legs/feet, (-) SOB, (-) cramping in legs/feet with walking. Respiratory: (-) SOB, (-) Coughing, (-) night sweats. GI: (-) nausea, (-) vomiting, (-) diarrhea, (-) blood in stool, (-) gastric ulcer.   Psychiatric: (-) Depression, (-) Anxiety, (-) bipolar disease, (-) Alzheimer's Disease  Allergic/Immunologic: (-) allergies latex, (-) allergies metal, (-) skin sensitivity. Hematlogic: (-) anemia, (-) blood transfusion, (-) DVT/PE, (-) Clotting disorders    Subjective:    Constitution:  Temp 98 °F (36.7 °C)   Ht 5' 1\" (1.549 m)   Wt 110 lb (49.9 kg)   BMI 20.78 kg/m²     Psycihatric:  The patient is alert and oriented x 3, appears to be stated age and in no distress. Respiratory:  Respiratory effort is not labored. Patient is not gasping. Palpation of the chest reveals no tactile fremitus. Skin:  Upon inspection: the skin appears warm, dry and intact. There is  a previous scar over the affected area. There is any cellulitis, lymphedema or cutaneous lesions noted in the lower extremities. Upon palpation there is no induration noted. Neurologic:  Gait: antalgic; Motor exam of the lower extremities show ; quadriceps, hamstrings, foot dorsi and plantar flexors intact R.  5/5 and L. 5/5. Deep tendon reflexes are 2/4 at the knees and 2/4 at the ankles with strong extensor hallicus longus motor strength bilaterally. Sensory to both feet is intact to all sensory roots. Cardiovascular: The vascular exam is normal and is well perfused to distal extremities. Distal pulses DP/PT: R. 2+; L. 2+. There is cap refill noted less than two seconds in all digits. There is not edema of the bilateral lower extremities. There is not varicosities noted in the distal extremities. Lymph:  Upon palpation,  there is no lymphadenopathy noted in bilateral lower extremities. Musculoskeletal:  Gait: antalgic; examination of the nails and digits reveal no cyanosis or clubbing. Lumbar exam:  On visual inspection, there is not deformity of the spine. full range of motion, no tenderness, palpable spasm or pain on motion. Special tests: Straight Leg Raise negative, Deja test negative. Hip exam:   Upon inspection, there is not deformity noted. Upon palpation there is not tenderness.   ROM: is  full and symmetrical.   Strength: Hip Flexors 5/5; Hip Abductors 5/5; Hip Adduction 5/5.     Knee exam:  Right knee exam shows;  range of motion of R. Knee is 0 to 120, and L. Knee is 0 to 120. The patient does not have  pain on motion, effusion is none, there is tenderness over the  popliteral region, there are not any masses, there is not ligamentous instability, there is not  deformity noted.    Knee exam: neither positive for moderate crepitations, some mild tenderness laxity is not noted with  stress.  There is not a popliteal cyst.    R. Knee:  Lachman's negative, Anterior Drawer negative, Posterior Drawer negative  Az's negative, Thallasy  negative,   PF grind test negative, Apprehension test negative, Patellar J sign  negative  L. Knee:  Lachman's negative, Anterior Drawer negative, Posterior Drawer negative  Az's negative, Thallasy  negative,   PF grind test negative, Apprehension test negative,  Patellar J sign  negative    Xray Exam:  Unremarkable knee xr  Radiographic findings reviewed with patient    Assessment:  Encounter Diagnoses   Name Primary?    Spinal stenosis of lumbar region, unspecified whether neurogenic claudication present Yes    Sprain of right knee, unspecified ligament, initial encounter        Plan:  Natural history and expected course discussed. Questions answered.  Educational materials distributed.  Rest, ice, compression, and elevation (RICE) therapy.  Reduction in offending activity.  I had a lengthy discussion with the patient regarding their diagnosis. I explained treatment options including surgical vs non surgical treatment. I reviewed in detail the risks and benefits and outlined the procedure in detail with expected outcomes and possible complications.  I also discussed non surgical treatment such as injections (CSI and visco supplementation), physical therapy, topical creams and NSAID's. They have elected for conservative management at this time.   Discussed with patient it appears  her knee is sound with no real issues I can identify.  However it does sound liek it could be her back, I will order PT and medrol dose pack, fu in 4 weeks

## 2023-01-30 ENCOUNTER — EVALUATION (OUTPATIENT)
Dept: PHYSICAL THERAPY | Age: 63
End: 2023-01-30
Payer: OTHER GOVERNMENT

## 2023-01-30 DIAGNOSIS — M48.061 SPINAL STENOSIS OF LUMBAR REGION, UNSPECIFIED WHETHER NEUROGENIC CLAUDICATION PRESENT: Primary | ICD-10-CM

## 2023-01-30 DIAGNOSIS — S83.91XA SPRAIN OF RIGHT KNEE, UNSPECIFIED LIGAMENT, INITIAL ENCOUNTER: ICD-10-CM

## 2023-01-30 PROCEDURE — 97163 PT EVAL HIGH COMPLEX 45 MIN: CPT | Performed by: PHYSICAL THERAPIST

## 2023-01-30 NOTE — PROGRESS NOTES
[facility] OUTPATIENT REHABILITATION  PHYSICAL THERAPY INITIAL EVALUATION         Date:  2023   Patient: Katie Gordillo  : 1960  MRN: [id number]  Referring Provider: DO Beth Givens6 David Yao     Medical Diagnosis:      Diagnosis Orders   1. Spinal stenosis of lumbar region, unspecified whether neurogenic claudication present        2. Sprain of right knee, unspecified ligament, initial encounter            Physician Order: Eval and Treat     SUBJECTIVE:     Onset date: 2022    History / Mechanism of Injury: Cyndi Calzada reports she was wrapping [place] gifts while sitting on floor; developed R lateral foot pain -- this foot pain continues. Repeated this more nights due to needing to wrap presents. About a week later she noticed throbbing R knee cap -- would throb only at night; not during ambulation. Additional report includes R lateral hip pain that occurs only after long periods of sitting. This goes away with walking around a little bit. Denies back pain. Denies paresthesia. Denies weakness. Denies trauma / fall. Self-reports severe osteoporosis. Disturbed Sleep: yes  Bladder Dysfunction: no  Bowel Dysfunction: no    Interventions for current problem: OTC NSAIDs, received Medrol dose pack; has not started yet. Chief complaint: pain, pain upon first movement    Behavior: condition is staying the same    Pain: intermittent  Current: 0/10     Best: 0/10     Worst:10/10 (occurs with getting up from chair). Pain returns to baseline in a few minutes    Symptom Type / Quality: R lateral hip: heavy weight. Knee: throbbing.   Ankle/foot: \"jaggy\"    Location[de-identified] Back: noted above           Provoking Activities/Positions:    Hip: first step pain   Knee: pushing vacuum, night pain   Foot: moving it sideways to don shoes (no weightbearing pain)                 Relieving Activitie/Positions: general movement except foot Imaging results: No results found. Past Medical History:  Past Medical History:   Diagnosis Date    Anxiety     Atypical chest pain     Blood transfusion     Bronchitis     chronic    Chronic fatigue syndrome     Depression     Fibromyositis     Gall stones     H/O cardiovascular stress test 4/23/12    OHI     Hiatal hernia     IBS (irritable bowel syndrome)     Mitral valve prolapse     Nausea & vomiting     Palpitations     Peptic ulcer     Schatzki's ring 2013    diganosed by dr Valentina Asif has been having endoscopies done for this    TIA (transient ischemic attack)     diagnosed in [facility]    Unspecified cerebral artery occlusion with cerebral infarction      Past Surgical History:   Procedure Laterality Date    APPENDECTOMY      ARTERY BIOPSY      left temporal BX 3/6/2012    BICEPS TENDON REPAIR  may 2012    left shoulder tendon repair by dr Joselyn Garza [address] Bilateral aug 2013    diana breast augmentation    ENDOSCOPY, COLON, DIAGNOSTIC      HYSTERECTOMY (CERVIX STATUS UNKNOWN)  2008    partial hystecotomy    SHOULDER ARTHROSCOPY  may 2012    rotator cuff repair subacromial decompression and debridment    UPPER GASTROINTESTINAL ENDOSCOPY      esophageal stricture    UPPER GASTROINTESTINAL ENDOSCOPY  october 2012    x2 by dr Valentina Asif due to questionable \"blood clot\" in throat. Biopsy was performed patient is not seeing him again until December    UPPER GASTROINTESTINAL ENDOSCOPY         Medications:   Current Outpatient Medications   Medication Sig Dispense Refill    methylPREDNISolone (MEDROL, ARCADIO,) 4 MG tablet Take 1 tablet by mouth See Admin Instructions for 6 days Take by mouth. 1 kit 0    LORazepam (ATIVAN) 0.5 MG tablet Take 0.5 mg by mouth 3 times daily as needed for Anxiety.       ALBUTEROL SULFATE ER PO Take by mouth      fluticasone (FLONASE) 50 MCG/ACT nasal spray 2 sprays by Each Nostril route daily      cyanocobalamin 1000 MCG/ML injection INJECT 1 ML INTO THE MUSCLE EVERY MONTH      metoprolol succinate (TOPROL XL) 25 MG extended release tablet 25 mg daily       Cholecalciferol (VITAMIN D) 50 MCG (2000 UT) CAPS capsule Take 2,000 Units by mouth daily      Ascorbic Acid (VITAMIN C) 500 MG CAPS Take 500 mg by mouth daily      traMADol (ULTRAM) 50 MG tablet Take 50 mg by mouth every 6 hours as needed for Pain . aspirin 81 MG tablet Take 81 mg by mouth daily. Calcium-Magnesium 500-250 MG TABS Take 1 tablet by mouth daily. 1000mg/400 mg/zinc 15 mg      Bisacodyl (DULCOLAX PO) Take 1 tablet by mouth as needed. (Patient not taking: No sig reported)      therapeutic multivitamin-minerals (THERAGRAN-M) tablet Take 1 tablet by mouth daily. Omeprazole (PRILOSEC PO) Take 40 mg by mouth daily At noon       No current facility-administered medications for this visit. Exercise regimen: jogging in place, indoor walking, dance    Hobbies: crafts, baking    Patient Goals: pain relief    Contraindications/Precautions: osteoporosis/osteopenia    OBJECTIVE:     Estimated body mass index is 20.78 kg/m² as calculated from the following:    Height as of 1/24/23: 5' 1\" (1.549 m). Weight as of 1/24/23: 110 lb (49.9 kg). Observations: well nourished female, normal affect     Inspection:  Pelvis symmetrical in height. Dextroscoliosis present. Scoliosis is structural.          Gait: normal    Functional Strength:   Able to toe walk, heel walk, and squat. Range of Motion:    Trunk:    Flexion:  [x] Normal   [] Limited    Extension:  [x] Normal   [] Limited     Right Rotation: [x] Normal   [] Limited    Left Rotation:  [x] Normal   [] Limited    Right Side Bending: [x] Normal   [] Limited    Left Side Bending: [x] Normal   [] Limited     Report of central LBP with extension and extension with overpressure. No LE symptoms with this testing.       Lower Extremity:   Right:   [x] Normal   [] Limited    Left:   [x] Normal   [] Limited   Hips, knees, feet - good motion    Lateral hip (greater trochanter) pain with R IR and ADD due to tensile load across greater trochanter. Strength:     Trunk: 5/5   R LE: 4/5 - hip, knee, foot due to pain   L LE: 5/5    Palpation:   Tender to palpation R greater trochanter. Tender to palpation  R knee medial patella rim    Tender to palpation 3rd metatarsal lateral cuneiform joint. Mobilization of this joint is uncomfortable. Sensation: intact to light touch and temperature. Special Tests:   [x] Nerve Root Compression           Right []+ / [x] -    Left []+ / [x] -  [] Slump           Right []+ / [] -    Left []+ / [] -  [x] FADIR          Right []+ / [x] -    Left []+ / [x] -  [] S-I Distraction          Right []+ / [] -    Left []+ / [] -     [x] SLR           Right []+ / [x] -    Left []+ / [x] -     [x] QUINN          Right []+ / [x] -    Left []+ / [x] -  [] S-I Compression          Right []+ / [] -    Left []+ / [] -   [] Other: []+ / [] -       Special Test Comments:   Back is clear. R knee: Negative Thessaly and Az's. ASSESSMENT     Elida appears to have 3 problems - R gluteal tendinopathy, R patellofemoral pain, R midfoot pain. The gluteal tendinopahty is old per patient. The foot and knee problems appear to be related to long duration awkward posture in a loaded position. Treatment for these is pain reduction followed by progressive loading protocols.       Problems:   Pain 10/10 intermittent, waxing / waning  Strength decreased   Limitations with limited tolerance to weightbearing tasks and weightbearing duration, sleep quality    [x] There are no barriers affecting plan of care or recovery    [] Barriers to this patient's plan of care or recovery include:      Domestic Concerns:  [x] No  [] Yes:    Short Term goals (3 weeks)  Pain 4-5/10    Long Term goals (6 weeks)  Pain 0-2/10  Strength 5/5  Sleep not limited due to pain  No first step pain  Independent with home exercise program (HEP)    Rehab Potential: [x] Good  [] Fair  [] Poor    PLAN       Treatment Plan:   instruction in home exercise program   therapeutic exercise   therapeutic activity   neuromuscular re-education   proprioceptive training   cold / hot pack  electrical stimulation     The following CPT codes are likely to be used in the care of this patient:   14233 PT Evaluation: High Complexity   28308 PT Re-Evaluation   72375 Therapeutic Exercise   20637 Neuromuscular Re-Education   72952 Therapeutic Activities    Electrical Stimulation    Suggested Professional Referral: [x] No  [] Yes:     Patient Education:  [x] Plans / Goals, Risks / Benefits discussed  [x] Home exercise program  Method of Education: [x] Verbal  [x] Demo  [x] Written  Comprehension of Education:  [x] Verbalizes understanding. [x] Demonstrates understanding. [] Needs Review. [] Demonstrates / verbalizes understanding of HEP / Nani Nogueira previously given. Frequency:  2 days per week for 6 weeks    Patient understands diagnosis/prognosis and consents to treatment, plan and goals: [x] Yes    [] No     Thank you for the opportunity to work with your patient. If you have questions or comments, please contact me at [phone]; fax: [phone]. Electronically signed by: Ruth Frazier PT         Please sign Physician's Certification and return to: [address] [place] [address] Drive PHYSICAL THERAPY  [address] [address]  Dept: [phone]  Dept Fax: [phone]  Loc: [phone] Certification / Comments     Frequency/Duration 2 days per week for 6 weeks. Certification period from 1/30/2023  to 4/30/2023. I have reviewed the Plan of Care established for skilled therapy services and certify that the services are required and that they will be provided while the patient is under my care.     Physician's Comments/Revisions:               Physician's Printed Name: Physician's Signature:                                                               Date:

## 2023-01-30 NOTE — PROGRESS NOTES
Physical Therapy Daily Treatment Note    Date: 2023  Patient Name: Jose Gordillo  : 1960   MRN: [id number]  DOInjury: R Hip: 1 year ago. R Knee and foot: 2022   DOSx: --  Referring Provider: Casey Stanley DO  [address] [facility]     Medical Diagnosis:      Diagnosis Orders   1. Spinal stenosis of lumbar region, unspecified whether neurogenic claudication present        2. Sprain of right knee, unspecified ligament, initial encounter            Elida appears to have 3 problems - R gluteal tendinopathy, R patellofemoral pain, R midfoot pain. The gluteal tendinopahty is old per patient. The foot and knee problems appear to be related to long duration awkward posture in a loaded position. Treatment for these is pain reduction followed by progressive loading protocols. Contraindications/Precautions: osteoporosis    X = TO BE PERFORMED NEXT VISIT  > = PROGRESS TO THIS    S: See eval  O:   Time [id number]       Visit       Pain Pain 1-10/10       ROM        Modalities         Ice / heat + / - ES prn    MO   Exercise         Can / ball rolling R foot X     Seated calf raise X   TE   Progression from 2-foot calf raise to Eccentric calf raise >   TE   Bridging 2-leg X   TE   S-lying hip ABD X  Add resistance  TE   Clamshell X Add resistance     LAQ with weight  X  Start with 90-45; progress to full motion            TE   Hip Hiking  >    NR   Single leg dead lift   >    NR   Latvian split squat vs squat  Depth modified as appropriate for patellofemoral pain >    NR             Phase 3         Leg Press 2-leg To 45 deg flex? TE   Knee Extension Machine Replace LAQ with this  90 flex to 45 flex? TE        TE   Calf Raises >    TE                               A:  Tolerated well. Discussed anatomy, physiology, body mechanics, principles of loading, and progressive loading/activity. Reviewed home exercise program extensively; written copy provided.      P: Continue with rehab plan  Nathan Grossman, PT    Treatment Charges: Mins Units   Initial Evaluation 45 1   Re-Evaluation     Ther Exercise         TE     Manual Therapy     MT     Ther Activities        TA     Gait Training          GT     Neuro Re-education NR     Modalities     Non-Billable Service Time     Other     Total Time/Units 45 1

## 2023-02-01 ENCOUNTER — TREATMENT (OUTPATIENT)
Dept: PHYSICAL THERAPY | Age: 63
End: 2023-02-01
Payer: OTHER GOVERNMENT

## 2023-02-01 DIAGNOSIS — S83.91XA SPRAIN OF RIGHT KNEE, UNSPECIFIED LIGAMENT, INITIAL ENCOUNTER: ICD-10-CM

## 2023-02-01 DIAGNOSIS — M48.061 SPINAL STENOSIS OF LUMBAR REGION, UNSPECIFIED WHETHER NEUROGENIC CLAUDICATION PRESENT: Primary | ICD-10-CM

## 2023-02-01 PROCEDURE — 97110 THERAPEUTIC EXERCISES: CPT

## 2023-02-01 NOTE — PROGRESS NOTES
Physical Therapy Daily Treatment Note    Date: 2023  Patient Name: Esperanza Gordillo  : 1960   MRN: [id number]  DOInjury: R Hip: 1 year ago. R Knee and foot: 2022   DOSx: --  Referring Provider: Adelso Ram DO  [address] [facility]     Medical Diagnosis:      Diagnosis Orders   1. Spinal stenosis of lumbar region, unspecified whether neurogenic claudication present        2. Sprain of right knee, unspecified ligament, initial encounter            Elida appears to have 3 problems - R gluteal tendinopathy, R patellofemoral pain, R midfoot pain. The gluteal tendinopahty is old per patient. The foot and knee problems appear to be related to long duration awkward posture in a loaded position. Treatment for these is pain reduction followed by progressive loading protocols. Contraindications/Precautions: osteoporosis    X = TO BE PERFORMED NEXT VISIT  > = PROGRESS TO THIS    S: pt reports no new changes since first rx session . Pt also states for right now she can only attend x 1 week due to having multiple medical appt's with her  . O:   Time 2220-9104 am       Visit       Pain Pain 1-10/10       ROM        Modalities         Ice / heat + / - ES prn    MO   Exercise         Can / ball rolling R foot 3 X 10 new      Seated calf raise 3 X 10 new    TE   Progression from 2-foot calf raise to Eccentric calf raise >   TE   Bridging 2-leg 3 X 10 new   pt given a copy of all ex's 2023  TE   S-lying hip ABD 3 X 10 new   Add resistance  TE   Clamshell 3 X 10 new  Add resistance     LAQ with weight x  Start with 90-45; progress to full motion            TE   Hip Hiking  >    NR   Single leg dead lift   >    NR   Urdu split squat vs squat  Depth modified as appropriate for patellofemoral pain >    NR             Phase 3         Leg Press 2-leg To 45 deg flex? TE   Knee Extension Machine Replace LAQ with this  90 flex to 45 flex?    TE        TE   Calf Raises >    TE                               A:  Tolerated well. Pt able to tolerate performing newly added ex's as listed . Discussed anatomy, physiology, body mechanics, principles of loading, and progressive loading/activity. Reviewed home exercise program extensively; written copy provided. P: Continue with rehab plan  . Pt wanting to attend x 1 week at this time.    Genoveva Aguirre PTA    Treatment Charges: Mins Units   Initial Evaluation     Re-Evaluation     Ther Exercise         TE 30 2   Manual Therapy     MT     Ther Activities        TA     Gait Training          GT     Neuro Re-education NR     Modalities     Non-Billable Service Time     Other     Total Time/Units 30 2

## 2023-02-06 ENCOUNTER — TELEPHONE (OUTPATIENT)
Dept: PHYSICAL THERAPY | Age: 63
End: 2023-02-06

## 2023-02-13 ENCOUNTER — TREATMENT (OUTPATIENT)
Dept: PHYSICAL THERAPY | Age: 63
End: 2023-02-13
Payer: OTHER GOVERNMENT

## 2023-02-13 DIAGNOSIS — S83.91XA SPRAIN OF RIGHT KNEE, UNSPECIFIED LIGAMENT, INITIAL ENCOUNTER: ICD-10-CM

## 2023-02-13 DIAGNOSIS — M48.061 SPINAL STENOSIS OF LUMBAR REGION, UNSPECIFIED WHETHER NEUROGENIC CLAUDICATION PRESENT: Primary | ICD-10-CM

## 2023-02-13 PROCEDURE — 97110 THERAPEUTIC EXERCISES: CPT

## 2023-02-13 NOTE — PROGRESS NOTES
Physical Therapy Daily Treatment Note    Date: 2023  Patient Name: Matt Gordillo  : 1960   MRN: [id number]  DOInjury: R Hip: 1 year ago. R Knee and foot: 2022   DOSx: --  Referring Provider: Fatou Bravo DO  [address]     Medical Diagnosis:      Diagnosis Orders   1. Spinal stenosis of lumbar region, unspecified whether neurogenic claudication present        2. Sprain of right knee, unspecified ligament, initial encounter            Elida appears to have 3 problems - R gluteal tendinopathy, R patellofemoral pain, R midfoot pain. The gluteal tendinopahty is old per patient. The foot and knee problems appear to be related to long duration awkward posture in a loaded position. Treatment for these is pain reduction followed by progressive loading protocols. Contraindications/Precautions: osteoporosis    X = TO BE PERFORMED NEXT VISIT  > = PROGRESS TO THIS    S: pt reports  feeling a little better since last visit , but her R foot is still a problem. . ( Pt also states for right now she can only attend x 1 week due to having multiple medical appt's with her  ) .   O:   Time 9014-6390 am       Visit 3/12      Pain Pain 1-10/10       ROM        Modalities         Ice / heat + / - ES prn    MO   Exercise         Can / ball rolling R foot 3 X 10      Seated calf raise 3 X 10    TE   Progression from 2-foot calf raise to Eccentric calf raise >   TE   Bridging 2-leg 3 X 10   pt given a copy of all ex's 2023  TE   S-lying hip ABD 3 X 10   Add resistance  TE   Clamshell 3 X 10  Add resistance     LAQ with weight x  Start with 90-45; progress to full motion       Nustep  L 5 x 5 min new     TE   Hip Hiking  >    NR   Single leg dead lift   >    NR   Scottish split squat vs squat  Depth modified as appropriate for patellofemoral pain >    NR             Phase 3    To 45 deg flex?      Leg Press 2-leg/ TG  L 17 3 x 10 to 45 deg flex    TE   Knee Extension Machine / LAQ's  2# 3 x 10 new   Replace LAQ with this  90 flex to 45 flex? TE        TE   Calf Raises 2# 3 x 10 new    TE                               A:  Tolerated well  Discussed anatomy, physiology, body mechanics, principles of loading, and progressive loading/activity. Reviewed home exercise program extensively; written copy provided. P: Continue with rehab plan  . Pt wanting to attend x 1 week at this time.    Miya Babcock PTA    Treatment Charges: Mins Units   Initial Evaluation     Re-Evaluation     Ther Exercise         TE 40 3   Manual Therapy     MT     Ther Activities        TA     Gait Training          GT     Neuro Re-education NR     Modalities     Non-Billable Service Time     Other     Total Time/Units 40 3

## 2023-02-21 ENCOUNTER — TREATMENT (OUTPATIENT)
Dept: PHYSICAL THERAPY | Age: 63
End: 2023-02-21
Payer: OTHER GOVERNMENT

## 2023-02-21 ENCOUNTER — OFFICE VISIT (OUTPATIENT)
Dept: ORTHOPEDIC SURGERY | Age: 63
End: 2023-02-21
Payer: OTHER GOVERNMENT

## 2023-02-21 VITALS — TEMPERATURE: 98 F | HEIGHT: 61 IN | BODY MASS INDEX: 20.77 KG/M2 | WEIGHT: 110 LBS

## 2023-02-21 DIAGNOSIS — M79.671 RIGHT FOOT PAIN: Primary | ICD-10-CM

## 2023-02-21 DIAGNOSIS — S83.91XA SPRAIN OF RIGHT KNEE, UNSPECIFIED LIGAMENT, INITIAL ENCOUNTER: ICD-10-CM

## 2023-02-21 DIAGNOSIS — M48.061 SPINAL STENOSIS OF LUMBAR REGION, UNSPECIFIED WHETHER NEUROGENIC CLAUDICATION PRESENT: Primary | ICD-10-CM

## 2023-02-21 DIAGNOSIS — M19.079 ARTHRITIS OF FOOT: ICD-10-CM

## 2023-02-21 PROCEDURE — 99213 OFFICE O/P EST LOW 20 MIN: CPT | Performed by: ORTHOPAEDIC SURGERY

## 2023-02-21 PROCEDURE — 97110 THERAPEUTIC EXERCISES: CPT

## 2023-02-21 NOTE — PROGRESS NOTES
Chief Complaint   Patient presents with    Foot Pain     Right foot has become worse. Pain is on the outside of the foot with certain movements       Esperanza Gordillo is a 65 y.o.   female who presents today  for evaluation of right foot pain. she reports this has been ongoing for the past several months. she does not remember a specific injury that started the pain. .  she reports the pain is located lateral foot, and is worse with activity and better with rest.      Past Medical History:   Diagnosis Date    Anxiety     Atypical chest pain     Blood transfusion     Bronchitis     chronic    Chronic fatigue syndrome     Depression     Fibromyositis     Gall stones     H/O cardiovascular stress test 4/23/12    OHI     Hiatal hernia     IBS (irritable bowel syndrome)     Mitral valve prolapse     Nausea & vomiting     Palpitations     Peptic ulcer     Schatzki's ring 2013    diganosed by dr Ele Sun has been having endoscopies done for this    TIA (transient ischemic attack)     diagnosed in [place] [address]    Unspecified cerebral artery occlusion with cerebral infarction      Past Surgical History:   Procedure Laterality Date    APPENDECTOMY      ARTERY BIOPSY      left temporal BX 3/6/2012    BICEPS TENDON REPAIR  may 2012    left shoulder tendon repair by dr Junaid Riley at [address] Bilateral aug 2013    diana breast augmentation    ENDOSCOPY, COLON, DIAGNOSTIC      HYSTERECTOMY (CERVIX STATUS UNKNOWN)  2008    partial hystecotomy    SHOULDER ARTHROSCOPY  may 2012    rotator cuff repair subacromial decompression and debridment    UPPER GASTROINTESTINAL ENDOSCOPY      esophageal stricture    UPPER GASTROINTESTINAL ENDOSCOPY  october 2012    x2 by dr lEe Sun due to questionable \"blood clot\" in throat.  Biopsy was performed patient is not seeing him again until December    UPPER GASTROINTESTINAL ENDOSCOPY         Current Outpatient Medications:     LORazepam (ATIVAN) 0.5 MG tablet, Take 0.5 mg by mouth 3 times daily as needed for Anxiety. , Disp: , Rfl:     ALBUTEROL SULFATE ER PO, Take by mouth, Disp: , Rfl:     fluticasone (FLONASE) 50 MCG/ACT nasal spray, 2 sprays by Each Nostril route daily, Disp: , Rfl:     cyanocobalamin 1000 MCG/ML injection, INJECT 1 ML INTO THE MUSCLE EVERY MONTH, Disp: , Rfl:     metoprolol succinate (TOPROL XL) 25 MG extended release tablet, 25 mg daily , Disp: , Rfl:     Cholecalciferol (VITAMIN D) 50 MCG (2000 UT) CAPS capsule, Take 2,000 Units by mouth daily, Disp: , Rfl:     Ascorbic Acid (VITAMIN C) 500 MG CAPS, Take 500 mg by mouth daily, Disp: , Rfl:     traMADol (ULTRAM) 50 MG tablet, Take 50 mg by mouth every 6 hours as needed for Pain ., Disp: , Rfl:     aspirin 81 MG tablet, Take 81 mg by mouth daily. , Disp: , Rfl:     Calcium-Magnesium 500-250 MG TABS, Take 1 tablet by mouth daily. 1000mg/400 mg/zinc 15 mg, Disp: , Rfl:     Bisacodyl (DULCOLAX PO), Take 1 tablet by mouth as needed. (Patient not taking: No sig reported), Disp: , Rfl:     therapeutic multivitamin-minerals (THERAGRAN-M) tablet, Take 1 tablet by mouth daily. , Disp: , Rfl:     Omeprazole (PRILOSEC PO), Take 40 mg by mouth daily At noon, Disp: , Rfl:   Allergies   Allergen Reactions    Codeine      dizziness    Other      Rapid heat rate after numbing medication given with dental work.      Social History     Socioeconomic History    Marital status:      Spouse name: Not on file    Number of children: Not on file    Years of education: Not on file    Highest education level: Not on file   Occupational History    Not on file   Tobacco Use    Smoking status: Never    Smokeless tobacco: Never   Vaping Use    Vaping Use: Never used   Substance and Sexual Activity    Alcohol use: No    Drug use: No    Sexual activity: Yes     Partners: Male   Other Topics Concern    Not on file   Social History Narrative    Not on file     Social Determinants of Health     Financial Resource Strain: Not on file   Food Insecurity: Not on file   Transportation Needs: Not on file   Physical Activity: Not on file   Stress: Not on file   Social Connections: Not on file   Intimate Partner Violence: Not on file   Housing Stability: Not on file     Family History   Problem Relation Age of Onset    Stroke Mother     Heart Disease Mother     High Cholesterol Mother     Heart Attack Mother     Cancer Father     Heart Disease Father     Heart Surgery Father     High Cholesterol Sister        REVIEW OF SYSTEMS:     General/Constitution:  (-)weight loss, (-)fever, (-)chills, (-)weakness. Skin: (-) rash,(-) psoriasis,(-) eczema, (-)skin cancer. Musculoskeletal: (-) fractures,  (-) dislocations,(-) collagen vascular disease, (-) fibromyalgia, (-) multiple sclerosis, (-) muscular dystrophy, (-) RSD,(-) joint pain (-)swelling, (-) joint pain,swelling. Neurologic: (-) epilepsy, (-)seizures,(-) brain tumor,(-) TIA, (-)stroke, (-)headaches, (-)Parkinson disease,(-) memory loss, (-) LOC. Cardiovascular: (-) Chest pain, (-) swelling in legs/feet, (-) SOB, (-) cramping in legs/feet with walking. Respiratory: (-) SOB, (-) Coughing, (-) night sweats. GI: (-) nausea, (-) vomiting, (-) diarrhea, (-) blood in stool, (-) gastric ulcer. Psychiatric: (-) Depression, (-) Anxiety, (-) bipolar disease, (-) Alzheimer's Disease  Allergic/Immunologic: (-) allergies latex, (-) allergies metal, (-) skin sensitivity. Hematlogic: (-) anemia, (-) blood transfusion, (-) DVT/PE, (-) Clotting disorders      EXAM:      Constitution:    Temp 98 °F (36.7 °C)   Ht 5' 1\" (1.549 m)   Wt 110 lb (49.9 kg)   BMI 20.78 kg/m²     Psycihatric:    The patient is alert and oriented x 3, appears to be stated age and in no distress. Respiratory:    Respiratory effort is not labored. Patient is not gasping. Palpation of the chest reveals no tactile fremitus. Skin:    Upon inspection: the skin appears warm, dry and intact.   There is not a previous scar over the affected area. There is not any cellulitis, lymphedema or cutaneous lesions noted in the lower extremities. Upon palpation there is no induration noted. Neurologic:      Motor exam of the lower extremities show ; quadriceps, hamstrings, foot dorsi and plantar flexors intact R.  5/5 and L. 5/5. Deep tendon reflexes are 2/4 at the knees and 2/4 at the ankles with strong extensor hallicus longus motor strength bilaterally. Sensory to both feet is intact to all sensory roots. Cardiovascular: The vascular exam is normal and is well perfused to distal extremities. Distal pulses DP/PT: R. 2+; L. 2+. There is cap refill noted less than two seconds in all digits. There is not edema of the bilateral lower extremities. There is not varicosities noted in the distal extremities. Lymph:    Upon palpation,  there is no lymphadenopathy noted in bilateral lower extremities. Musculoskeletal:    Gait: antalgic; examination of the nails and digits reveal no cyanosis or clubbing. Knee exam - bilateral knee exam shows;  range of motion of R. Knee is 0 to 120, and L. Knee is 0 to 120. The patient does not have  pain on motion, there is not an effusion, there is not tenderness over the  medial, lateral, anterior region, there are not any masses, there is not ligamentous instability, there is not  deformity noted. Knee exam: the injured knee reveals normal exam, no swelling, tenderness, instability; ligaments intact, FROM. Knee exam: neither positive for moderate crepitations, some mild tenderness laxity is not noted with  stress. Ankle Exam:    Upon inspection and palpation of the Right ankle,  there is not deformity noted,  no swelling, no ecchymosis, does not have pain on palpation of ankle. ROM R/L : DF 15/15; PF 35/35;  INV 15/15, ERNIE 15/15. This exam was compared bilaterally.        Right Ankle:   (-) Anterior Drawer ,  (-) Posterior Drawer ,  (-) Squeeze test,  (-) External Rotation, (-) Eversion test , (-) Centeno Test     Left ankle:   (-) Anterior Drawer ,(-)  Posterior Drawer ,(-) Squeeze test,(-) External Rotation (-) Eversion test, (-) Centeno Test.      Foot exam- visual inspection reveals warm, good capillary refill, there is  pain to palpation over the lateral foot and cuboid region. ROM inversion/eversion full range of motion, abduction/adduction full range of motion, ROM in MTP/PIP/DIP full range of motion. Xrays:5th metatarsal and cuboid narrowing     Radiographic findings reviewed with patient    Impression:  Encounter Diagnoses   Name Primary? Right foot pain Yes    Arthritis of foot          Plan:Natural history and expected course discussed. Questions answered. Rest, ice, compression, elevation (RICE) therapy. Hep  Activity as tolerated  Offered podiatry referral, she will hold off for now.

## 2023-02-21 NOTE — PROGRESS NOTES
Physical Therapy Daily Treatment Note    Date: 2023  Patient Name: Mary Caputo  : 1960   MRN: [id number]  DOInjury: R Hip: 1 year ago. R Knee and foot: 2022   DOSx: --  Referring Provider: Tasia Barrett DO  [address]  [facility]     Medical Diagnosis:      Diagnosis Orders   1. Spinal stenosis of lumbar region, unspecified whether neurogenic claudication present        2. Sprain of right knee, unspecified ligament, initial encounter            Elida appears to have 3 problems - R gluteal tendinopathy, R patellofemoral pain, R midfoot pain. The gluteal tendinopahty is old per patient. The foot and knee problems appear to be related to long duration awkward posture in a loaded position. Treatment for these is pain reduction followed by progressive loading protocols. Contraindications/Precautions: osteoporosis    X = TO BE PERFORMED NEXT VISIT  > = PROGRESS TO THIS    S: pt reports her R foot is still really hurting a lot will let  Dr. Emilee Chester  know today with RTD visit. ( Pt also states for right now she can only attend x 1 week due to having multiple medical appt's with her  ) .   O:   Time 1370-4271  am       Visit       Pain Pain 1-10/10       ROM        Modalities         Ice / heat + / - ES prn    MO   Exercise         Can / ball rolling R foot     Seated calf raise   TE   Progression from 2-foot calf raise to Eccentric calf raise >   TE   Bridging 2-leg 3 X 10   pt given a copy of all ex's 2023  TE   S-lying hip ABD 3 X 10   Add resistance  TE   Clamshell 3 X 10  Add resistance     LAQ with weight x  Start with 90-45; progress to full motion       Nustep       TE   Recumbent bike  X 10 min      Hip Hiking  >    NR   Single leg dead lift   >    NR   Slovenian split squat vs squat  Depth modified as appropriate for patellofemoral pain >    NR             Phase 3    To 45 deg flex?      Leg Press 2-leg/ TG  L 17 3 x 10 to 45 deg flex    TE Knee Extension Machine / LAQ's  2# 3 x 10   Replace LAQ with this  90 flex to 45 flex? TE        TE   Calf Raises   TE                               A:  Tolerated fair  . Limited ex's today due to pt's c/o R foot soreness/pain with certain movements . Pt has a RTD visit today . Discussed anatomy, physiology, body mechanics, principles of loading, and progressive loading/activity. Reviewed home exercise program extensively; written copy provided. P: Continue with rehab plan  . Pt wanting to attend x 1 week at this time.    Lucía Lee PTA    Treatment Charges: Mins Units   Initial Evaluation     Re-Evaluation     Ther Exercise         TE 30 2   Manual Therapy     MT     Ther Activities        TA     Gait Training          GT     Neuro Re-education NR     Modalities     Non-Billable Service Time     Other     Total Time/Units 30 2

## 2023-07-10 ENCOUNTER — HOSPITAL ENCOUNTER (EMERGENCY)
Age: 63
Discharge: HOME | End: 2023-07-10
Payer: OTHER GOVERNMENT

## 2023-07-10 ENCOUNTER — APPOINTMENT (OUTPATIENT)
Dept: GENERAL RADIOLOGY | Age: 63
End: 2023-07-10
Payer: OTHER GOVERNMENT

## 2023-07-10 VITALS
HEART RATE: 74 BPM | DIASTOLIC BLOOD PRESSURE: 69 MMHG | TEMPERATURE: 98.6 F | HEIGHT: 61 IN | RESPIRATION RATE: 18 BRPM | BODY MASS INDEX: 21.71 KG/M2 | SYSTOLIC BLOOD PRESSURE: 142 MMHG | OXYGEN SATURATION: 99 % | WEIGHT: 115 LBS

## 2023-07-10 DIAGNOSIS — S63.91XA SPRAIN, HAND, RIGHT, INITIAL ENCOUNTER: Primary | ICD-10-CM

## 2023-07-10 PROCEDURE — 99211 OFF/OP EST MAY X REQ PHY/QHP: CPT

## 2023-07-10 PROCEDURE — 73130 X-RAY EXAM OF HAND: CPT

## 2023-08-23 ENCOUNTER — HOSPITAL ENCOUNTER (EMERGENCY)
Age: 63
Discharge: HOME | End: 2023-08-23

## 2023-08-23 ENCOUNTER — APPOINTMENT (OUTPATIENT)
Dept: GENERAL RADIOLOGY | Age: 63
End: 2023-08-23

## 2023-08-23 VITALS
SYSTOLIC BLOOD PRESSURE: 135 MMHG | WEIGHT: 110 LBS | HEART RATE: 66 BPM | BODY MASS INDEX: 20.78 KG/M2 | OXYGEN SATURATION: 99 % | RESPIRATION RATE: 18 BRPM | TEMPERATURE: 98.1 F | DIASTOLIC BLOOD PRESSURE: 78 MMHG

## 2023-08-23 DIAGNOSIS — S93.609A SPRAIN OF FOOT, UNSPECIFIED LATERALITY, INITIAL ENCOUNTER: Primary | ICD-10-CM

## 2023-08-23 PROCEDURE — 73630 X-RAY EXAM OF FOOT: CPT

## 2023-08-23 PROCEDURE — 73610 X-RAY EXAM OF ANKLE: CPT

## 2023-08-23 PROCEDURE — 99211 OFF/OP EST MAY X REQ PHY/QHP: CPT

## 2023-08-23 ASSESSMENT — PAIN DESCRIPTION - LOCATION: LOCATION: FOOT

## 2023-08-23 ASSESSMENT — PAIN - FUNCTIONAL ASSESSMENT: PAIN_FUNCTIONAL_ASSESSMENT: 0-10

## 2023-08-23 ASSESSMENT — PAIN DESCRIPTION - ORIENTATION: ORIENTATION: LEFT

## 2023-08-23 ASSESSMENT — PAIN DESCRIPTION - PAIN TYPE: TYPE: CHRONIC PAIN;ACUTE PAIN

## 2023-08-23 ASSESSMENT — PAIN DESCRIPTION - DESCRIPTORS: DESCRIPTORS: ACHING;THROBBING;NUMBNESS;TINGLING

## 2023-08-23 ASSESSMENT — PAIN DESCRIPTION - FREQUENCY: FREQUENCY: INTERMITTENT

## 2023-08-23 NOTE — ED PROVIDER NOTES
Department of Emergency Medicine   [facility] [address] [place] [address]  Provider Note  Admit Date/Time: 2023 10:24 AM  Room:   NAME: Adilson Gordillo  : 1960  MRN: [id number]     Chief Complaint:  Foot Injury (Left foot pain, fell a couple weeks ago off her table she was standing on)    History of Present Illness        Adilson Gordillo is a 65 y.o. female who has a past medical history of:   Past Medical History:   Diagnosis Date    Anxiety     Atypical chest pain     Blood transfusion     Bronchitis     chronic    Chronic fatigue syndrome     Depression     Fibromyositis     Gall stones     H/O cardiovascular stress test 12    OHI     Hiatal hernia     IBS (irritable bowel syndrome)     Mitral valve prolapse     Nausea & vomiting     Palpitations     Peptic ulcer     Schatzki's ring 2013    diganosed by dr Darcie Osler has been having endoscopies done for this    TIA (transient ischemic attack)     diagnosed in [address] [place] [address]    Unspecified cerebral artery occlusion with cerebral infarction     presents to the urgent care center by private car for evaluation. She injured her left foot and ankle 2 weeks ago. She was standing on a wooden table holding up a light while her  was wiring it and the table started tipping and she fell this happened 2 weeks ago. She denies any other other injuries. She is still having pain and throbbing when she is just sits and relaxes or when she walks on it. ROS    Pertinent positives and negatives are stated within HPI, all other systems reviewed and are negative.     Past Surgical History:   Procedure Laterality Date    APPENDECTOMY      ARTERY BIOPSY      left temporal BX 3/6/2012    BICEPS TENDON REPAIR  may 2012    left shoulder tendon repair by dr Elroy Hoffmann at [facility] Bilateral aug 2013    diana breast augmentation    ENDOSCOPY, COLON, DIAGNOSTIC      HYSTERECTOMY (CERVIX STATUS UNKNOWN)      partial

## 2024-02-22 ENCOUNTER — OFFICE VISIT (OUTPATIENT)
Dept: CARDIOLOGY CLINIC | Age: 64
End: 2024-02-22
Payer: OTHER GOVERNMENT

## 2024-02-22 VITALS
OXYGEN SATURATION: 99 % | DIASTOLIC BLOOD PRESSURE: 62 MMHG | BODY MASS INDEX: 22.66 KG/M2 | HEART RATE: 76 BPM | RESPIRATION RATE: 18 BRPM | HEIGHT: 61 IN | WEIGHT: 120 LBS | SYSTOLIC BLOOD PRESSURE: 112 MMHG

## 2024-02-22 DIAGNOSIS — R00.2 PALPITATIONS: ICD-10-CM

## 2024-02-22 DIAGNOSIS — I49.3 PVC'S (PREMATURE VENTRICULAR CONTRACTIONS): Primary | ICD-10-CM

## 2024-02-22 PROCEDURE — 99214 OFFICE O/P EST MOD 30 MIN: CPT | Performed by: INTERNAL MEDICINE

## 2024-02-22 PROCEDURE — 93000 ELECTROCARDIOGRAM COMPLETE: CPT | Performed by: INTERNAL MEDICINE

## 2024-02-22 RX ORDER — VIT C/B6/B5/MAGNESIUM/HERB 173 50-5-6-5MG
1500 CAPSULE ORAL DAILY
COMMUNITY

## 2024-02-22 NOTE — PROGRESS NOTES
Sexual Activity    Alcohol use: No    Drug use: No    Sexual activity: Yes     Partners: Male   Other Topics Concern    Not on file   Social History Narrative    Not on file     Social Determinants of Health     Financial Resource Strain: Low Risk  (1/10/2022)    Overall Financial Resource Strain (CARDIA)     Difficulty of Paying Living Expenses: Not hard at all   Food Insecurity: No Food Insecurity (1/10/2022)    Hunger Vital Sign     Worried About Running Out of Food in the Last Year: Never true     Ran Out of Food in the Last Year: Never true   Transportation Needs: Not on file   Physical Activity: Not on file   Stress: Not on file   Social Connections: Not on file   Intimate Partner Violence: Not on file   Housing Stability: Not on file       Family History   Problem Relation Age of Onset    Stroke Mother     Heart Disease Mother     High Cholesterol Mother     Heart Attack Mother     Cancer Father     Heart Disease Father     Heart Surgery Father     High Cholesterol Sister        REVIEW OF SYSTEMS:   CONSTITUTIONAL:  negative for  fevers, chills, sweats and fatigue  HEENT:  negative for  tinnitus, earaches, nasal congestion and epistaxis  RESPIRATORY:  negative for  dry cough, cough with sputum, wheezing and hemoptysis  GASTROINTESTINAL:  negative for nausea, vomiting, diarrhea, constipation, pruritus and jaundice  HEMATOLOGIC/LYMPHATIC:  negative for easy bruising, bleeding, lymphadenopathy and petechiae  ENDOCRINE:  negative for heat intolerance, cold intolerance, tremor, hair loss and diabetic symptoms including neither polyuria nor polydipsia nor blurred vision  MUSCULOSKELETAL:  negative for  myalgias, arthralgias, joint swelling, stiff joints and decreased range of motion  NEUROLOGICAL:  negative for memory problems, speech problems, visual disturbance, dysphagia, weakness and numbness      PHYSICAL EXAM:   CONSTITUTIONAL:  awake, alert, cooperative, no apparent distress, and appears stated age  HEAD:

## 2024-03-18 ENCOUNTER — HOSPITAL ENCOUNTER (OUTPATIENT)
Dept: MRI IMAGING | Age: 64
Discharge: HOME | End: 2024-03-20
Payer: OTHER GOVERNMENT

## 2024-03-18 DIAGNOSIS — Z98.82: ICD-10-CM

## 2024-03-18 PROCEDURE — 6360000004 HC RX CONTRAST MEDICATION: Performed by: RADIOLOGY

## 2024-03-18 PROCEDURE — A9585 GADOBUTROL INJECTION: HCPCS | Performed by: RADIOLOGY

## 2024-03-18 PROCEDURE — C8908 MRI W/O FOL W/CONT, BREAST,: HCPCS

## 2024-03-18 RX ORDER — GADOBUTROL 604.72 MG/ML
5.5 INJECTION INTRAVENOUS
Status: COMPLETED | OUTPATIENT
Start: 2024-03-18 | End: 2024-03-18

## 2024-03-18 RX ADMIN — GADOBUTROL 5.5 ML: 604.72 INJECTION INTRAVENOUS at 11:12

## 2024-03-21 DIAGNOSIS — I49.3 PVC'S (PREMATURE VENTRICULAR CONTRACTIONS): ICD-10-CM

## 2024-03-21 DIAGNOSIS — R00.2 PALPITATIONS: ICD-10-CM

## 2024-03-26 ENCOUNTER — TELEPHONE (OUTPATIENT)
Dept: CARDIOLOGY CLINIC | Age: 64
End: 2024-03-26

## 2024-03-26 DIAGNOSIS — M79.641 RIGHT HAND PAIN: Primary | ICD-10-CM

## 2024-03-26 NOTE — TELEPHONE ENCOUNTER
Robert Arellano MD McGee, Shelia, MA Hi Shelia:  Would you please let her know that her monitor was unremarkable  Thank you    Called patient gave results.

## 2024-04-02 ENCOUNTER — OFFICE VISIT (OUTPATIENT)
Dept: ORTHOPEDIC SURGERY | Age: 64
End: 2024-04-02
Payer: OTHER GOVERNMENT

## 2024-04-02 ENCOUNTER — TELEPHONE (OUTPATIENT)
Dept: ADMINISTRATIVE | Age: 64
End: 2024-04-02

## 2024-04-02 VITALS — HEIGHT: 61 IN | BODY MASS INDEX: 22.66 KG/M2 | TEMPERATURE: 98 F | WEIGHT: 120 LBS

## 2024-04-02 DIAGNOSIS — M18.11 ARTHRITIS OF CARPOMETACARPAL (CMC) JOINT OF RIGHT THUMB: Primary | ICD-10-CM

## 2024-04-02 PROCEDURE — 99214 OFFICE O/P EST MOD 30 MIN: CPT | Performed by: ORTHOPAEDIC SURGERY

## 2024-04-02 NOTE — TELEPHONE ENCOUNTER
Patient is needing a cardiac clearance completed before 5/10/2024 for Dr. William Hernandez.     Fax: [phone]

## 2024-04-02 NOTE — PROGRESS NOTES
extension wnl , PIP flexion wnl/ extension wnl, DIP flexion wnl/ extension wnl.  There is not rotational deformity.    There is no masses or adenopathy in bilateral upper extremities.  Radial pulses are 2+ and symmetric bilaterally.  Capillary refill is intact and < 2 seconds.  Motor strength is 5/5 with flexion and extension of the small finger joints.      Right:  Phallens sign negative, Tinnells sign negative, Median nerve compression test negative ,  Finklesteins negative, CMC Grind test positive, Piano Key Test negative.     Left:  Phallens sign negative, Tinnells sign negative, Median nerve compression test negative ,  Finklesteins negative, CMC Grind test negative, Piano Key Test negative.    Xrays: CMC joint arthritis    Radiographic findings reviewed with patient    Impression:   Encounter Diagnosis   Name Primary?    Arthritis of carpometacarpal (CMC) joint of right thumb Yes       Plan: Natural history and expected course discussed. Questions answered.  Educational materials distributed.  Rest, ice, compression, and elevation (RICE) therapy.  Reduction in offending activity discussed.  OTC analgesics as needed.    Patient would like to proceed with right CMC IPA    We will perform a Right thumb CMC IPA on 5/10/24.  The risks and benefits were reviewed with the patient such as:  DVT, infection,  injuries to blood vessels and nerves, non relief of symptoms, continued pain, worsening of symptoms.

## 2024-04-26 ENCOUNTER — TELEPHONE (OUTPATIENT)
Dept: ORTHOPEDIC SURGERY | Age: 64
End: 2024-04-26

## 2024-04-26 ENCOUNTER — PREP FOR PROCEDURE (OUTPATIENT)
Dept: ORTHOPEDIC SURGERY | Age: 64
End: 2024-04-26

## 2024-04-26 DIAGNOSIS — M18.11 OSTEOARTHRITIS OF THUMB, RIGHT: ICD-10-CM

## 2024-04-26 NOTE — TELEPHONE ENCOUNTER
Prior Authorization Form:      DEMOGRAPHICS:                     Patient Name:  Ena Palm  Patient :  1960            Insurance:  Payor:  EAST / Plan:  EAST / Product Type: *No Product type* /   Insurance ID Number:    Payer/Plan Subscr  Sex Relation Sub. Ins. ID Effective Group Num   1. [facility] * DARON PALM,* 1960 Female Self [id number] 1/1/15                                    [address]         DIAGNOSIS & PROCEDURE:                       Procedure/Operation: RIGHT HAND CMC INTERPOSITIONAL ARTHROPLASTY WITH LIGAMENT RECONSTRUCTION           CPT Code: 00942    Diagnosis:  RIGHT CMC ARTHRITIS    ICD10 Code: M18.11    Location:  [facility]    Surgeon:  ELIANE GARCIA    SCHEDULING INFORMATION:                          Date: 5/10/24     Time: TO FOLLOW              Anesthesia:  General                                                       Status:  Outpatient        Special Comments:  ARTHREX       Electronically signed by Anastasiya Pimentel ATC on 2024 at 11:49 AM

## 2024-05-02 ENCOUNTER — TELEPHONE (OUTPATIENT)
Dept: ORTHOPEDIC SURGERY | Age: 64
End: 2024-05-02

## 2024-05-02 NOTE — TELEPHONE ENCOUNTER
Pt called in she said her  came in yesterday to  a medical clearance form, and the date on the form is wrong. She said its dated for 5/10/24, but her surgery is on 5/24/24. I advised her, the surgery is on 5/10/24, and her post op appt is on 5/24/24. She said the surgery was suppose to be r/s for 5/24/24, due to a dental procedure she is having on 5/7/24. Ena can be reached at [phone].

## 2024-05-20 ENCOUNTER — ANESTHESIA EVENT (OUTPATIENT)
Dept: OPERATING ROOM | Age: 64
End: 2024-05-20
Payer: OTHER GOVERNMENT

## 2024-05-23 NOTE — ANESTHESIA PRE PROCEDURE
Never    Smokeless tobacco: Never   Substance Use Topics    Alcohol use: No                                Counseling given: Not Answered      Vital Signs (Current):   Vitals:    05/17/24 1019   Weight: 52.2 kg (115 lb)   Height: 1.549 m (5' 1\")                                              BP Readings from Last 3 Encounters:   02/22/24 112/62   08/23/23 135/78   07/10/23 (!) 142/69       NPO Status:                                                                                 BMI:   Wt Readings from Last 3 Encounters:   04/02/24 54.4 kg (120 lb)   02/22/24 54.4 kg (120 lb)   08/23/23 49.9 kg (110 lb)     Body mass index is 21.73 kg/m².    CBC:   Lab Results   Component Value Date/Time    WBC 4.7 08/14/2013 09:15 AM    RBC 4.17 08/14/2013 09:15 AM    HGB 12.7 08/14/2013 09:15 AM    HCT 36.6 08/14/2013 09:15 AM    MCV 87.7 08/14/2013 09:15 AM    RDW 13.4 08/14/2013 09:15 AM     08/14/2013 09:15 AM       CMP:   Lab Results   Component Value Date/Time     08/14/2013 09:15 AM    K 4.4 08/14/2013 09:15 AM     08/14/2013 09:15 AM    CO2 33 08/14/2013 09:15 AM    BUN 9 08/14/2013 09:15 AM    CREATININE 0.9 08/14/2013 09:15 AM    LABGLOM >60 08/14/2013 09:50 AM    GLUCOSE 104 08/14/2013 09:15 AM    GLUCOSE 83 05/31/2012 09:30 AM    CALCIUM 10.0 08/14/2013 09:15 AM    BILITOT 0.4 09/24/2012 11:50 AM    ALKPHOS 119 09/24/2012 11:50 AM    AST 31 09/24/2012 11:50 AM    ALT 29 09/24/2012 11:50 AM       POC Tests: No results for input(s): \"POCGLU\", \"POCNA\", \"POCK\", \"POCCL\", \"POCBUN\", \"POCHEMO\", \"POCHCT\" in the last 72 hours.    Coags:   Lab Results   Component Value Date/Time    PROTIME 13.6 09/24/2012 11:50 AM    PROTIME 14.1 03/05/2012 09:05 AM    INR 1.2 09/24/2012 11:50 AM       HCG (If Applicable): No results found for: \"PREGTESTUR\", \"PREGSERUM\", \"HCG\", \"HCGQUANT\"     ABGs: No results found for: \"PHART\", \"PO2ART\", \"TDJ5RSL\", \"WUM7LKX\", \"BEART\", \"U1ZDOIYZ\"     Type & Screen (If Applicable):  No results

## 2024-05-24 ENCOUNTER — ANESTHESIA (OUTPATIENT)
Dept: OPERATING ROOM | Age: 64
End: 2024-05-24
Payer: OTHER GOVERNMENT

## 2024-05-24 ENCOUNTER — HOSPITAL ENCOUNTER (OUTPATIENT)
Dept: OPERATING ROOM | Age: 64
Setting detail: OUTPATIENT SURGERY
Discharge: HOME | End: 2024-05-24
Attending: ORTHOPAEDIC SURGERY
Payer: OTHER GOVERNMENT

## 2024-05-24 ENCOUNTER — HOSPITAL ENCOUNTER (OUTPATIENT)
Age: 64
Setting detail: OUTPATIENT SURGERY
Discharge: HOME | End: 2024-05-24
Attending: ORTHOPAEDIC SURGERY | Admitting: ORTHOPAEDIC SURGERY
Payer: OTHER GOVERNMENT

## 2024-05-24 VITALS
DIASTOLIC BLOOD PRESSURE: 76 MMHG | HEIGHT: 61 IN | RESPIRATION RATE: 16 BRPM | BODY MASS INDEX: 21.71 KG/M2 | OXYGEN SATURATION: 97 % | HEART RATE: 79 BPM | WEIGHT: 115 LBS | TEMPERATURE: 98.6 F | SYSTOLIC BLOOD PRESSURE: 131 MMHG

## 2024-05-24 DIAGNOSIS — M18.11 OSTEOARTHRITIS OF THUMB, RIGHT: Primary | ICD-10-CM

## 2024-05-24 DIAGNOSIS — M18.11 OSTEOARTHRITIS OF THUMB, RIGHT: ICD-10-CM

## 2024-05-24 PROCEDURE — 25310 TRANSPLANT FOREARM TENDON: CPT | Performed by: ORTHOPAEDIC SURGERY

## 2024-05-24 PROCEDURE — 2580000003 HC RX 258: Performed by: ANESTHESIOLOGY

## 2024-05-24 PROCEDURE — 3700000001 HC ADD 15 MINUTES (ANESTHESIA): Performed by: ORTHOPAEDIC SURGERY

## 2024-05-24 PROCEDURE — 2580000003 HC RX 258

## 2024-05-24 PROCEDURE — 6360000002 HC RX W HCPCS: Performed by: ANESTHESIOLOGY

## 2024-05-24 PROCEDURE — 3700000000 HC ANESTHESIA ATTENDED CARE: Performed by: ORTHOPAEDIC SURGERY

## 2024-05-24 PROCEDURE — 2709999900 HC NON-CHARGEABLE SUPPLY: Performed by: ORTHOPAEDIC SURGERY

## 2024-05-24 PROCEDURE — 6360000002 HC RX W HCPCS: Performed by: NURSE ANESTHETIST, CERTIFIED REGISTERED

## 2024-05-24 PROCEDURE — 6360000002 HC RX W HCPCS

## 2024-05-24 PROCEDURE — 7100000000 HC PACU RECOVERY - FIRST 15 MIN: Performed by: ORTHOPAEDIC SURGERY

## 2024-05-24 PROCEDURE — 25000 INCISION OF TENDON SHEATH: CPT | Performed by: ORTHOPAEDIC SURGERY

## 2024-05-24 PROCEDURE — C1713 ANCHOR/SCREW BN/BN,TIS/BN: HCPCS | Performed by: ORTHOPAEDIC SURGERY

## 2024-05-24 PROCEDURE — 3600000015 HC SURGERY LEVEL 5 ADDTL 15MIN: Performed by: ORTHOPAEDIC SURGERY

## 2024-05-24 PROCEDURE — 3600000005 HC SURGERY LEVEL 5 BASE: Performed by: ORTHOPAEDIC SURGERY

## 2024-05-24 PROCEDURE — 6360000002 HC RX W HCPCS: Performed by: ORTHOPAEDIC SURGERY

## 2024-05-24 PROCEDURE — 2580000003 HC RX 258: Performed by: NURSE ANESTHETIST, CERTIFIED REGISTERED

## 2024-05-24 PROCEDURE — 7100000001 HC PACU RECOVERY - ADDTL 15 MIN: Performed by: ORTHOPAEDIC SURGERY

## 2024-05-24 PROCEDURE — 7100000010 HC PHASE II RECOVERY - FIRST 15 MIN: Performed by: ORTHOPAEDIC SURGERY

## 2024-05-24 PROCEDURE — 2500000003 HC RX 250 WO HCPCS: Performed by: NURSE ANESTHETIST, CERTIFIED REGISTERED

## 2024-05-24 PROCEDURE — 6370000000 HC RX 637 (ALT 250 FOR IP): Performed by: ANESTHESIOLOGY

## 2024-05-24 PROCEDURE — 25447 ARTHRP NTRCRP/CRP/MTCR NTRPS: CPT | Performed by: ORTHOPAEDIC SURGERY

## 2024-05-24 PROCEDURE — 7100000011 HC PHASE II RECOVERY - ADDTL 15 MIN: Performed by: ORTHOPAEDIC SURGERY

## 2024-05-24 DEVICE — IMPL SYS,CMC LGMNT RECON
Type: IMPLANTABLE DEVICE | Site: HAND | Status: FUNCTIONAL
Brand: ARTHREX®

## 2024-05-24 RX ORDER — MEPERIDINE HYDROCHLORIDE 25 MG/ML
12.5 INJECTION INTRAMUSCULAR; INTRAVENOUS; SUBCUTANEOUS EVERY 5 MIN PRN
Status: DISCONTINUED | OUTPATIENT
Start: 2024-05-24 | End: 2024-05-24 | Stop reason: HOSPADM

## 2024-05-24 RX ORDER — FENTANYL CITRATE 50 UG/ML
INJECTION, SOLUTION INTRAMUSCULAR; INTRAVENOUS PRN
Status: DISCONTINUED | OUTPATIENT
Start: 2024-05-24 | End: 2024-05-24 | Stop reason: SDUPTHER

## 2024-05-24 RX ORDER — MIDAZOLAM HYDROCHLORIDE 1 MG/ML
2 INJECTION INTRAMUSCULAR; INTRAVENOUS
Status: DISCONTINUED | OUTPATIENT
Start: 2024-05-24 | End: 2024-05-24 | Stop reason: HOSPADM

## 2024-05-24 RX ORDER — NALOXONE HYDROCHLORIDE 0.4 MG/ML
INJECTION, SOLUTION INTRAMUSCULAR; INTRAVENOUS; SUBCUTANEOUS PRN
Status: DISCONTINUED | OUTPATIENT
Start: 2024-05-24 | End: 2024-05-24 | Stop reason: HOSPADM

## 2024-05-24 RX ORDER — SODIUM CHLORIDE, SODIUM LACTATE, POTASSIUM CHLORIDE, CALCIUM CHLORIDE 600; 310; 30; 20 MG/100ML; MG/100ML; MG/100ML; MG/100ML
INJECTION, SOLUTION INTRAVENOUS CONTINUOUS
Status: DISCONTINUED | OUTPATIENT
Start: 2024-05-24 | End: 2024-05-24 | Stop reason: HOSPADM

## 2024-05-24 RX ORDER — HYDRALAZINE HYDROCHLORIDE 20 MG/ML
10 INJECTION INTRAMUSCULAR; INTRAVENOUS
Status: DISCONTINUED | OUTPATIENT
Start: 2024-05-24 | End: 2024-05-24 | Stop reason: HOSPADM

## 2024-05-24 RX ORDER — SODIUM CHLORIDE 9 MG/ML
INJECTION, SOLUTION INTRAVENOUS PRN
Status: DISCONTINUED | OUTPATIENT
Start: 2024-05-24 | End: 2024-05-24 | Stop reason: HOSPADM

## 2024-05-24 RX ORDER — LABETALOL HYDROCHLORIDE 5 MG/ML
10 INJECTION, SOLUTION INTRAVENOUS
Status: DISCONTINUED | OUTPATIENT
Start: 2024-05-24 | End: 2024-05-24 | Stop reason: HOSPADM

## 2024-05-24 RX ORDER — DEXAMETHASONE SODIUM PHOSPHATE 10 MG/ML
INJECTION, SOLUTION INTRAMUSCULAR; INTRAVENOUS PRN
Status: DISCONTINUED | OUTPATIENT
Start: 2024-05-24 | End: 2024-05-24 | Stop reason: SDUPTHER

## 2024-05-24 RX ORDER — IPRATROPIUM BROMIDE AND ALBUTEROL SULFATE 2.5; .5 MG/3ML; MG/3ML
1 SOLUTION RESPIRATORY (INHALATION)
Status: DISCONTINUED | OUTPATIENT
Start: 2024-05-24 | End: 2024-05-24 | Stop reason: HOSPADM

## 2024-05-24 RX ORDER — ONDANSETRON 2 MG/ML
INJECTION INTRAMUSCULAR; INTRAVENOUS PRN
Status: DISCONTINUED | OUTPATIENT
Start: 2024-05-24 | End: 2024-05-24 | Stop reason: SDUPTHER

## 2024-05-24 RX ORDER — SODIUM CHLORIDE 0.9 % (FLUSH) 0.9 %
5-40 SYRINGE (ML) INJECTION PRN
Status: DISCONTINUED | OUTPATIENT
Start: 2024-05-24 | End: 2024-05-24 | Stop reason: HOSPADM

## 2024-05-24 RX ORDER — SODIUM CHLORIDE, SODIUM LACTATE, POTASSIUM CHLORIDE, CALCIUM CHLORIDE 600; 310; 30; 20 MG/100ML; MG/100ML; MG/100ML; MG/100ML
INJECTION, SOLUTION INTRAVENOUS CONTINUOUS PRN
Status: DISCONTINUED | OUTPATIENT
Start: 2024-05-24 | End: 2024-05-24 | Stop reason: SDUPTHER

## 2024-05-24 RX ORDER — SODIUM CHLORIDE 0.9 % (FLUSH) 0.9 %
5-40 SYRINGE (ML) INJECTION EVERY 12 HOURS SCHEDULED
Status: DISCONTINUED | OUTPATIENT
Start: 2024-05-24 | End: 2024-05-24 | Stop reason: HOSPADM

## 2024-05-24 RX ORDER — SCOLOPAMINE TRANSDERMAL SYSTEM 1 MG/1
1 PATCH, EXTENDED RELEASE TRANSDERMAL ONCE
Status: DISCONTINUED | OUTPATIENT
Start: 2024-05-24 | End: 2024-05-24 | Stop reason: HOSPADM

## 2024-05-24 RX ORDER — METOCLOPRAMIDE HYDROCHLORIDE 5 MG/ML
INJECTION INTRAMUSCULAR; INTRAVENOUS PRN
Status: DISCONTINUED | OUTPATIENT
Start: 2024-05-24 | End: 2024-05-24 | Stop reason: SDUPTHER

## 2024-05-24 RX ORDER — BUPIVACAINE HYDROCHLORIDE 2.5 MG/ML
INJECTION, SOLUTION EPIDURAL; INFILTRATION; INTRACAUDAL PRN
Status: DISCONTINUED | OUTPATIENT
Start: 2024-05-24 | End: 2024-05-24 | Stop reason: ALTCHOICE

## 2024-05-24 RX ORDER — PROPOFOL 10 MG/ML
INJECTION, EMULSION INTRAVENOUS PRN
Status: DISCONTINUED | OUTPATIENT
Start: 2024-05-24 | End: 2024-05-24 | Stop reason: SDUPTHER

## 2024-05-24 RX ORDER — MIDAZOLAM HYDROCHLORIDE 1 MG/ML
INJECTION INTRAMUSCULAR; INTRAVENOUS PRN
Status: DISCONTINUED | OUTPATIENT
Start: 2024-05-24 | End: 2024-05-24 | Stop reason: SDUPTHER

## 2024-05-24 RX ORDER — HYDROCODONE BITARTRATE AND ACETAMINOPHEN 5; 325 MG/1; MG/1
1 TABLET ORAL EVERY 6 HOURS PRN
Qty: 28 TABLET | Refills: 0 | Status: SHIPPED | OUTPATIENT
Start: 2024-05-24 | End: 2024-05-30 | Stop reason: SDUPTHER

## 2024-05-24 RX ORDER — HYDROCODONE BITARTRATE AND ACETAMINOPHEN 5; 325 MG/1; MG/1
1 TABLET ORAL ONCE
Status: COMPLETED | OUTPATIENT
Start: 2024-05-24 | End: 2024-05-24

## 2024-05-24 RX ORDER — FAMOTIDINE 10 MG/ML
INJECTION, SOLUTION INTRAVENOUS PRN
Status: DISCONTINUED | OUTPATIENT
Start: 2024-05-24 | End: 2024-05-24 | Stop reason: SDUPTHER

## 2024-05-24 RX ORDER — LIDOCAINE HYDROCHLORIDE 20 MG/ML
INJECTION, SOLUTION INTRAVENOUS PRN
Status: DISCONTINUED | OUTPATIENT
Start: 2024-05-24 | End: 2024-05-24 | Stop reason: SDUPTHER

## 2024-05-24 RX ORDER — ONDANSETRON 2 MG/ML
4 INJECTION INTRAMUSCULAR; INTRAVENOUS
Status: DISCONTINUED | OUTPATIENT
Start: 2024-05-24 | End: 2024-05-24 | Stop reason: HOSPADM

## 2024-05-24 RX ADMIN — METOCLOPRAMIDE 20 MG: 5 INJECTION, SOLUTION INTRAMUSCULAR; INTRAVENOUS at 07:06

## 2024-05-24 RX ADMIN — FENTANYL CITRATE 50 MCG: 50 INJECTION, SOLUTION INTRAMUSCULAR; INTRAVENOUS at 06:35

## 2024-05-24 RX ADMIN — FAMOTIDINE 20 MG: 10 INJECTION, SOLUTION INTRAVENOUS at 06:28

## 2024-05-24 RX ADMIN — PROPOFOL 120 MG: 10 INJECTION, EMULSION INTRAVENOUS at 06:35

## 2024-05-24 RX ADMIN — HYDROCODONE BITARTRATE AND ACETAMINOPHEN 1 TABLET: 5; 325 TABLET ORAL at 09:03

## 2024-05-24 RX ADMIN — CEFAZOLIN 2000 MG: 2 INJECTION, POWDER, FOR SOLUTION INTRAMUSCULAR; INTRAVENOUS at 06:40

## 2024-05-24 RX ADMIN — ONDANSETRON 4 MG: 2 INJECTION INTRAMUSCULAR; INTRAVENOUS at 07:44

## 2024-05-24 RX ADMIN — SODIUM CHLORIDE, POTASSIUM CHLORIDE, SODIUM LACTATE AND CALCIUM CHLORIDE: 600; 310; 30; 20 INJECTION, SOLUTION INTRAVENOUS at 06:01

## 2024-05-24 RX ADMIN — HYDROMORPHONE HYDROCHLORIDE 0.5 MG: 1 INJECTION, SOLUTION INTRAMUSCULAR; INTRAVENOUS; SUBCUTANEOUS at 08:24

## 2024-05-24 RX ADMIN — SODIUM CHLORIDE, POTASSIUM CHLORIDE, SODIUM LACTATE AND CALCIUM CHLORIDE: 600; 310; 30; 20 INJECTION, SOLUTION INTRAVENOUS at 06:32

## 2024-05-24 RX ADMIN — MIDAZOLAM 2 MG: 1 INJECTION INTRAMUSCULAR; INTRAVENOUS at 06:28

## 2024-05-24 RX ADMIN — FENTANYL CITRATE 25 MCG: 50 INJECTION, SOLUTION INTRAMUSCULAR; INTRAVENOUS at 08:02

## 2024-05-24 RX ADMIN — LIDOCAINE HYDROCHLORIDE 100 MG: 20 INJECTION, SOLUTION INTRAVENOUS at 06:35

## 2024-05-24 RX ADMIN — FENTANYL CITRATE 25 MCG: 50 INJECTION, SOLUTION INTRAMUSCULAR; INTRAVENOUS at 07:06

## 2024-05-24 RX ADMIN — DEXAMETHASONE SODIUM PHOSPHATE 10 MG: 10 INJECTION, SOLUTION INTRAMUSCULAR; INTRAVENOUS at 06:45

## 2024-05-24 ASSESSMENT — PAIN - FUNCTIONAL ASSESSMENT
PAIN_FUNCTIONAL_ASSESSMENT: 0-10

## 2024-05-24 ASSESSMENT — PAIN DESCRIPTION - LOCATION
LOCATION: HAND
LOCATION: HAND

## 2024-05-24 ASSESSMENT — PAIN SCALES - GENERAL
PAINLEVEL_OUTOF10: 9
PAINLEVEL_OUTOF10: 9

## 2024-05-24 ASSESSMENT — PAIN DESCRIPTION - ORIENTATION
ORIENTATION: RIGHT
ORIENTATION: LEFT

## 2024-05-24 ASSESSMENT — PAIN DESCRIPTION - DESCRIPTORS
DESCRIPTORS: THROBBING
DESCRIPTORS: THROBBING

## 2024-05-24 NOTE — ANESTHESIA POSTPROCEDURE EVALUATION
Department of Anesthesiology  Postprocedure Note    Patient: Ena Fields  MRN: [id number]  YOB: 1960  Date of evaluation: 5/24/2024    Procedure Summary       Date: 05/24/24 Room / Location: [address]    Anesthesia Start: 0628 Anesthesia Stop: 0813    Procedure: RIGHT HAND CMC INTERPOSITIONAL ARTHROPLASTY WITH LIGAMENT RECONSTRUCTION- 5/10/24 ARTHREX (Right: Hand) Diagnosis:       Osteoarthritis of thumb, right      (Osteoarthritis of thumb, right [M18.11])    Surgeons: William Hernandez DO Responsible Provider: Brady Neri MD    Anesthesia Type: general ASA Status: 3            Anesthesia Type: No value filed.    Nikki Phase I: Nikki Score: 10    Nikki Phase II:      Anesthesia Post Evaluation    Patient location during evaluation: PACU  Patient participation: complete - patient participated  Level of consciousness: awake  Airway patency: patent  Nausea & Vomiting: no nausea and no vomiting  Cardiovascular status: hemodynamically stable  Respiratory status: acceptable  Hydration status: stable  Pain management: adequate    No notable events documented.

## 2024-05-24 NOTE — DISCHARGE INSTRUCTIONS
[facility] Arthroplasty Post-op Instructions    [facility]  [phone]  William Hernandez D.O.      Keep dressing in place.    Ice and elevate.    You may shower with soap and water on post-op day #1 but keep your incision clean and dry.    Non-weight bearing activities with affected hand until post-op appointment.    No lifting over 5 pounds. No heavy lifting, pushing or pulling.    May begin moving fingers immediately also move elbow and shoulder.    Take pain medications as prescribed.  Take with food.  If you anticipate the need for a refill on your medication and the weekend is approaching, please call the office by noon on Friday.  No refills will be called in over the weekend.  DO NOT take Tylenol products while taking prescribed pain medicine.    Resume regular diet and medications (unless otherwise directed by your doctor).    You should have a responsible adult with you for 24 hours.    If you have any questions or concerns, please call the office.       If any problems occur or if you have any further questions, please call your doctor as soon as possible.  If you find that you cannot reach your doctor but feel that your condition needs a doctor’s attention go to the emergency room.                 Nausea and Vomiting After Surgery: Care Instructions  Your Care Instructions     After you've had surgery, you may feel sick to your stomach (nauseated) or you may vomit. Sometimes anesthesia can make you feel sick. It's a common side effect and often doesn't last long. Pain also can make you feel sick or vomit. After the anesthesia wears off, you may feel pain from the incision (cut). That pain could then upset your stomach. Taking pain medicine can also make you feel sick to your stomach.  Whatever the cause, you may get medicine that can help. There are also some things you can do at home to prevent nausea and feel better.  The doctor has checked you carefully, but problems can develop

## 2024-05-24 NOTE — OP NOTE
Operative Note      Patient: Ena Fields  YOB: 1960  MRN: [id number]    Date of Procedure: 5/24/2024    Pre-Op Diagnosis Codes:     * Osteoarthritis of thumb, right [M18.11]cmc joint + de quirvains tenosynovitis    Post-Op Diagnosis: Same       Procedure(s):  RIGHT HAND CMC INTERPOSITIONAL ARTHROPLASTY WITH LIGAMENT RECONSTRUCTION- 5/10/24 ARTHREX with 1st dorsal compartment release    Surgeon(s):  William Hernandez DO    Assistant:   Resident: Maxwell Beebe DO; Juan Francisco Cr DO    Anesthesia: General    Estimated Blood Loss (mL): less than 100     Complications: None    Specimens:   * No specimens in log *    Implants:  Implant Name Type Inv. Item Serial No.  Lot No. LRB No. Used Action   SYSTEM IMPL FOR CMC LIG RECON - [id number]  SYSTEM IMPL FOR CMC LIG RECON  [employer] [id number] Right 1 Implanted         Drains: * No LDAs found *    Findings:  Infection Present At Time Of Surgery (PATOS) (choose all levels that have infection present):  No infection present  Other Findings: as above    Detailed Description of Procedure:   Below          OPERATIVE PROCEDURE: The patient was taken to the operative suite and was  given general anesthesia. The right arm was identified with preoperative  time-out. Placed a tourniquet on the right arm. Prepped and draped the arm  in sterile fashion.      I then outlined an incision along the radial border of the thumb, over the  base of the metacarpal to the mid carpal level. I then exsanguinated the  limb and inflated the tourniquet to 250 mmHg. I made an incision through  skin and subcutaneous tissue. I protected the superficial branch of the  radial nerve and the radial artery on the dorsal surface. I dissected down  to the level of the anterior aspect of the dorsal thumb. I then created  equal flaps of both dorsum and plantar-pace. I exposed the underlying base  of the metacarpal.     The AP, and EPB were retracted and

## 2024-05-24 NOTE — H&P
Updated H&P    Chief Complaint   Patient presents with    Hand Pain       Right hand pain for the past 2 years has gotten worse in the past few months was told she had a torn tendon.         Ena Fields is a 63 y.o. year old  female who presents for evaluation of right hand pain.  she reports this started 2 years ago but has gotten worse over the past few months.  she does not remember a specific injury that started the pain.  The injury was none. Her pain is located mainly in the thumb. The pain is worse with movement, lifting and better with rest.  The patient has tried OTC analgesics, ice.  The treatment has not been effective.  The patient is Right hand dominant.      Past Medical History        Past Medical History:   Diagnosis Date    Anxiety      Atypical chest pain      Blood transfusion      Bronchitis       chronic    Chronic fatigue syndrome      Depression      Fibromyositis      Gall stones      H/O cardiovascular stress test 4/23/12     OHI     Hiatal hernia      IBS (irritable bowel syndrome)      Mitral valve prolapse      Nausea & vomiting      Palpitations      Peptic ulcer      Schatzki's ring 2013     diganosed by dr stephenson has been having endoscopies done for this    TIA (transient ischemic attack)       diagnosed in [place]    Unspecified cerebral artery occlusion with cerebral infarction           Past Surgical History         Past Surgical History:   Procedure Laterality Date    APPENDECTOMY        ARTERY BIOPSY         left temporal BX 3/6/2012    BICEPS TENDON REPAIR   may 2012     left shoulder tendon repair by dr molina at [facility]    BREAST SURGERY Bilateral aug 2013     [facility] breast augmentation    ENDOSCOPY, COLON, DIAGNOSTIC        HYSTERECTOMY (CERVIX STATUS UNKNOWN)   2008     partial hystecotomy    SHOULDER ARTHROSCOPY   may 2012     rotator cuff repair subacromial decompression and debridment    UPPER GASTROINTESTINAL ENDOSCOPY         esophageal  the hand is  intact.  There is not evidence of scar, lesion, laceration, or abrasion.  The motion in the small joints of the hand are intact with no stiffness or deformity.  The ROM in the MCP flexion wnl/ extension wnl , PIP flexion wnl/ extension wnl, DIP flexion wnl/ extension wnl.  There is not rotational deformity.    There is no masses or adenopathy in bilateral upper extremities.  Radial pulses are 2+ and symmetric bilaterally.  Capillary refill is intact and < 2 seconds.  Motor strength is 5/5 with flexion and extension of the small finger joints.      Right:  Phallens sign negative, Tinnells sign negative, Median nerve compression test negative ,  Finklesteins negative, CMC Grind test positive, Piano Key Test negative.      Left:  Phallens sign negative, Tinnells sign negative, Median nerve compression test negative ,  Finklesteins negative, CMC Grind test negative, Piano Key Test negative.     Xrays: CMC joint arthritis     Radiographic findings reviewed with patient     Impression:        Encounter Diagnosis   Name Primary?    Arthritis of carpometacarpal (CMC) joint of right thumb Yes         Plan: Natural history and expected course discussed. Questions answered.  Educational materials distributed.  Rest, ice, compression, and elevation (RICE) therapy.  Reduction in offending activity discussed.  OTC analgesics as needed.     Patient would like to proceed with right CMC IPA     We will perform a Right thumb CMC IPA on 5/10/24.  The risks and benefits were reviewed with the patient such as:  DVT, infection,  injuries to blood vessels and nerves, non relief of symptoms, continued pain, worsening of symptoms.

## 2024-05-30 DIAGNOSIS — M18.11 OSTEOARTHRITIS OF THUMB, RIGHT: ICD-10-CM

## 2024-05-30 RX ORDER — HYDROCODONE BITARTRATE AND ACETAMINOPHEN 5; 325 MG/1; MG/1
1 TABLET ORAL EVERY 6 HOURS PRN
Qty: 28 TABLET | Refills: 0 | Status: SHIPPED | OUTPATIENT
Start: 2024-05-30 | End: 2024-06-06

## 2024-06-05 DIAGNOSIS — M18.11 ARTHRITIS OF CARPOMETACARPAL (CMC) JOINT OF RIGHT THUMB: Primary | ICD-10-CM

## 2024-06-07 ENCOUNTER — OFFICE VISIT (OUTPATIENT)
Dept: ORTHOPEDIC SURGERY | Age: 64
End: 2024-06-07

## 2024-06-07 VITALS — WEIGHT: 115 LBS | BODY MASS INDEX: 21.71 KG/M2 | HEIGHT: 61 IN

## 2024-06-07 DIAGNOSIS — M18.11 OSTEOARTHRITIS OF THUMB, RIGHT: Primary | ICD-10-CM

## 2024-06-07 PROCEDURE — 99024 POSTOP FOLLOW-UP VISIT: CPT

## 2024-06-07 NOTE — PROGRESS NOTES
Ena Sonia Gustafson Fields is here for followup after right CMC IPA surgery. Pain is controlled with current analgesics.  Medication(s) being used: Norco. The patient notes improvement in the following symptoms:pain.  The patient denies fever, wound drainage, increasing redness, pus, increasing pain, increasing swelling. Post op problems reported: none.         Objective:         General :    alert, appears stated age, and cooperative   Sutures:   Sutures out.   Incision:  healing well, no significant drainage, no dehiscence, no significant erythema   Tenderness:  mild   Flexion ROM:  diminished range of motion   Extension ROM:  diminished range of motion   Effusion:  mild         Assessment:        Status post right CMC IPA surgery. Doing well postoperatively.     Xray: S/p trapeziectomy  Plan:     Sutures removed today.  HEP  Thumb spica  No heavy lifting, pushing, pulling  Can get incision wet, do not submerge until closed  Can work on ROM of wrist and fingers  Follow up: 4 weeks.

## 2024-06-10 ENCOUNTER — TELEPHONE (OUTPATIENT)
Dept: ORTHOPEDIC SURGERY | Age: 64
End: 2024-06-10

## 2024-06-10 DIAGNOSIS — G89.18 POST-OP PAIN: Primary | ICD-10-CM

## 2024-06-10 RX ORDER — HYDROCODONE BITARTRATE AND ACETAMINOPHEN 5; 325 MG/1; MG/1
1 TABLET ORAL EVERY 6 HOURS PRN
Qty: 28 TABLET | Refills: 0 | Status: SHIPPED | OUTPATIENT
Start: 2024-06-10 | End: 2024-06-17

## 2024-06-10 NOTE — TELEPHONE ENCOUNTER
Pt requesting refill on HYDROcodone-acetaminophen (NORCO) 5-325 MG per tablet [[id number]]     [facility] - [place], [place] - [address] - P [phone] - F [phone] [address] [address]  Phone: [phone]  Fax: [phone]

## 2024-06-25 ENCOUNTER — TELEPHONE (OUTPATIENT)
Dept: ORTHOPEDIC SURGERY | Age: 64
End: 2024-06-25

## 2024-06-25 DIAGNOSIS — M18.11 OSTEOARTHRITIS OF THUMB, RIGHT: Primary | ICD-10-CM

## 2024-06-25 RX ORDER — HYDROCODONE BITARTRATE AND ACETAMINOPHEN 5; 325 MG/1; MG/1
1 TABLET ORAL EVERY 8 HOURS PRN
Qty: 21 TABLET | Refills: 0 | Status: SHIPPED | OUTPATIENT
Start: 2024-06-25 | End: 2024-07-02

## 2024-06-25 NOTE — TELEPHONE ENCOUNTER
HYDROcodone-acetaminophen (NORCO) 5-325 MG per tablet [[id number]]     [facility] - [place], [place] - [address] - P [phone] - F [phone]  [address] [facility] [address]  Phone: [phone]  Fax: [phone]

## 2024-07-01 ENCOUNTER — OFFICE VISIT (OUTPATIENT)
Dept: ORTHOPEDIC SURGERY | Age: 64
End: 2024-07-01

## 2024-07-01 VITALS — BODY MASS INDEX: 21.71 KG/M2 | HEIGHT: 61 IN | WEIGHT: 115 LBS

## 2024-07-01 DIAGNOSIS — M18.11 ARTHRITIS OF CARPOMETACARPAL (CMC) JOINT OF RIGHT THUMB: Primary | ICD-10-CM

## 2024-07-01 PROCEDURE — 99024 POSTOP FOLLOW-UP VISIT: CPT | Performed by: ORTHOPAEDIC SURGERY

## 2024-07-01 NOTE — PROGRESS NOTES
Ena Sonia Fields is here for followup after right CMC IPA surgery 5/24/24.  Patient states she is having severe pain recently and is unable to use her right hand.  She can move her fingers but not her thumb      Objective:         General :    alert, appears stated age, and cooperative   Sutures:   Sutures out.   Incision:  healing well, no significant drainage, no dehiscence, no significant erythema   Tenderness:  mild   Flexion ROM:  diminished range of motion   Extension ROM:  diminished range of motion   Effusion:  mild         Assessment:        Status post right CMC IPA surgery. Doing well postoperatively.     Xray: S/p trapeziectomy  Plan:     I will order OT for her to begin next week.  I explained she needs to begin moving it on her own.  She will keep her thumb spica brace for one weeks.  I will see her back in 6 weeks.

## 2024-07-05 NOTE — PROGRESS NOTES
the Plan of Care established for skilled therapy services and certify that the services are required and that they will be provided while the patient is under my care.     Practitioner's Comments/Revisions:           Practitioner's Printed Name:  Dr Wm JENNIFER Hernandez                                   Practitioner's Signature:                                                               Date:      Please review Patient's OT evaluation and if you agree sign/date and [facility] Fax: [phone]. Thank you for your referral!

## 2024-07-08 ENCOUNTER — EVALUATION (OUTPATIENT)
Dept: OCCUPATIONAL THERAPY | Age: 64
End: 2024-07-08
Payer: OTHER GOVERNMENT

## 2024-07-08 DIAGNOSIS — M18.11 PRIMARY OSTEOARTHRITIS OF FIRST CARPOMETACARPAL JOINT OF RIGHT HAND: Primary | ICD-10-CM

## 2024-07-08 PROCEDURE — 97165 OT EVAL LOW COMPLEX 30 MIN: CPT | Performed by: OCCUPATIONAL THERAPIST

## 2024-07-08 PROCEDURE — 97530 THERAPEUTIC ACTIVITIES: CPT | Performed by: OCCUPATIONAL THERAPIST

## 2024-07-12 ENCOUNTER — TREATMENT (OUTPATIENT)
Dept: OCCUPATIONAL THERAPY | Age: 64
End: 2024-07-12

## 2024-07-12 DIAGNOSIS — M18.11 PRIMARY OSTEOARTHRITIS OF FIRST CARPOMETACARPAL JOINT OF RIGHT HAND: Primary | ICD-10-CM

## 2024-07-12 DIAGNOSIS — M18.11 OSTEOARTHRITIS OF THUMB, RIGHT: ICD-10-CM

## 2024-07-12 RX ORDER — HYDROCODONE BITARTRATE AND ACETAMINOPHEN 5; 325 MG/1; MG/1
1 TABLET ORAL EVERY 8 HOURS PRN
Qty: 21 TABLET | Refills: 0 | Status: SHIPPED | OUTPATIENT
Start: 2024-07-12 | End: 2024-07-19

## 2024-07-12 NOTE — PROGRESS NOTES
actiovity and pain mgmt. : Treatment covered based on POC and graduated to patient's progress. Pt education continues at each visit to obtain maximum benefits from skilled OT intervention.  []  Alter Plan of care:   []  Discharge:      Lawanda Conner OT /DICK  [id number]

## 2024-07-15 ENCOUNTER — TREATMENT (OUTPATIENT)
Dept: OCCUPATIONAL THERAPY | Age: 64
End: 2024-07-15
Payer: OTHER GOVERNMENT

## 2024-07-15 DIAGNOSIS — M18.11 PRIMARY OSTEOARTHRITIS OF FIRST CARPOMETACARPAL JOINT OF RIGHT HAND: Primary | ICD-10-CM

## 2024-07-15 PROCEDURE — 97140 MANUAL THERAPY 1/> REGIONS: CPT | Performed by: OCCUPATIONAL THERAPIST

## 2024-07-15 PROCEDURE — 97110 THERAPEUTIC EXERCISES: CPT | Performed by: OCCUPATIONAL THERAPIST

## 2024-07-15 PROCEDURE — 97530 THERAPEUTIC ACTIVITIES: CPT | Performed by: OCCUPATIONAL THERAPIST

## 2024-07-15 PROCEDURE — 97022 WHIRLPOOL THERAPY: CPT | Performed by: OCCUPATIONAL THERAPIST

## 2024-07-15 NOTE — PROGRESS NOTES
OCCUPATIONAL THERAPY DAILY NOTE  [facility] PHYSICIANS [place] SPECIALTY [facility] OCCUPATIONAL THERAPY   [address]  [place] [address]  Dept: [phone]  Loc: [phone]   [facility] OT Fax: [phone]      Date:  7/15/2024  Initial Evaluation Date: 24     Evaluating Therapist: Lawanda Conner OT    Patient Name:  Ena Fields    :  1960    Restrictions/Precautions:  ROM as tolerated , Low fall risk  Diagnosis:  M18.11 (ICD-10-CM) - Arthritis of carpometacarpal (CMC) joint of right thumb                                                               Date of Surgery/Injury: 24 R CMC arthroplasty     Insurance/Certification information:   [place]  Plan of care signed (Y/N): N  Visit# / total visits: 3/ 12     Referring Practitioner:  Dr Wm JENNIFER Hernandez  Specific Practitioner Orders: OT evaluate and treat     Assessment of current deficits   [] Functional mobility             [x] ADLs           [x] Strength                  [] Cognition   [] Functional transfers           [x] IADLs          [] Safety Awareness  [] Endurance   [x] Fine Motor Coordination    [] Balance      [] Vision/perception    [] Sensation     [] Gross Motor Coordination [x] ROM           [x] Pain                        [x] Edema          [x] Scar Adhesion/Skin Integrity      OT PLAN OF CARE   OT POC based on physician orders, patient diagnosis and results of clinical assessment     Frequency/Duration: 2x/ week x 6 weeks     Specific OT Treatment to include:   [x] Instruction in HEP                   Modalities:  [x] Therapeutic Exercise                 [x] Ultrasound               [] Electrical Stimulation/Attended  [x] PROM/Stretching                    [x] Fluidotherapy          [x]  Paraffin                   [x] AAROM  [x] AROM                 [] Iontophoresis:   [] Tendon Glides                                               [] Neuromuscular Re-Ed            [] ADL/IADL re-training

## 2024-07-19 ENCOUNTER — TREATMENT (OUTPATIENT)
Dept: OCCUPATIONAL THERAPY | Age: 64
End: 2024-07-19
Payer: OTHER GOVERNMENT

## 2024-07-19 DIAGNOSIS — M18.11 PRIMARY OSTEOARTHRITIS OF FIRST CARPOMETACARPAL JOINT OF RIGHT HAND: Primary | ICD-10-CM

## 2024-07-19 PROCEDURE — 97530 THERAPEUTIC ACTIVITIES: CPT | Performed by: OCCUPATIONAL THERAPIST

## 2024-07-19 PROCEDURE — 97110 THERAPEUTIC EXERCISES: CPT | Performed by: OCCUPATIONAL THERAPIST

## 2024-07-19 PROCEDURE — 97022 WHIRLPOOL THERAPY: CPT | Performed by: OCCUPATIONAL THERAPIST

## 2024-07-19 PROCEDURE — 97140 MANUAL THERAPY 1/> REGIONS: CPT | Performed by: OCCUPATIONAL THERAPIST

## 2024-07-19 NOTE — PROGRESS NOTES
Updated POC to be completed by visit 6.    INTERVENTION: COMPLETED: SPECIFICS/COMMENTS:   Modality:     Fluidotherapy- R x - 10 min to decrease joint stiffness/ low heat   US x R wrist  - 5 min to decrease swelling and promote ^ circulation   AROM:     R hand AROM X  X  X    X    X  x - thumb IP blocking ex 15x  - thumb ABD/ ADD and radial ext 15x each  - alternating opposition to each digit except SF using pom poms  -  pom poms 1 at a time/ hold in hand and place 1 at a time  - towel activity 10x--> ^ 15x  - Exercise balls CLW 20x    R wrist AROM x - tabletop ball roll ex for wrist flex/ ext   AAROM:               PROM/Stretching:     Hand PROM x - all digits and thumb as tolerated  - gentle thumb flexion stretch- tolerated well    Wrist PROM x - very tight and sore in the volar forearm   Scar Mass/Edema Control:     Scar massage x         Strengthening:               Other:     Comfort cool brace x Pt fit with a medium comfort cool brace to support the CMC while actively advancing her activity at home- use is only as needed.    HEP  - thumb IP blocking ex  - thumb radial ext/ ABD, progressive opposition  - tenodesis     Assessment/Comments: Swelling and joint stiffness remains an issue in the right thumb/ wrist. Pt may benefit from a steroid to help. Will monitor status and approach MD office as appropriate.  Thumb/ hand/ wrist ROM exercises continued with some improvements in thumb mobility. Increased tightness is present in the forearm. Will address more at next session.     -Rehab Potential: Good   -Patient Response to Treatment: Mild soreness is present at Tx end.    Patient. Education:  [x] Plans/Goals, Risks/Benefits discussed  [] Home exercise program  Method of Education: [x] Verbal  [x] Demo  [] Written  Comprehension of Education:  [x] Verbalizes understanding.  [x] Demonstrates understanding.  [] Needs Review.  [] Demonstrates/verbalizes understanding of HEP/Ed previously given.      Time In:

## 2024-07-22 ENCOUNTER — TREATMENT (OUTPATIENT)
Dept: OCCUPATIONAL THERAPY | Age: 64
End: 2024-07-22
Payer: OTHER GOVERNMENT

## 2024-07-22 DIAGNOSIS — M18.11 PRIMARY OSTEOARTHRITIS OF FIRST CARPOMETACARPAL JOINT OF RIGHT HAND: Primary | ICD-10-CM

## 2024-07-22 PROCEDURE — 97140 MANUAL THERAPY 1/> REGIONS: CPT

## 2024-07-22 PROCEDURE — 97018 PARAFFIN BATH THERAPY: CPT

## 2024-07-22 PROCEDURE — 97110 THERAPEUTIC EXERCISES: CPT

## 2024-07-22 NOTE — PROGRESS NOTES
OCCUPATIONAL THERAPY DAILY NOTE  [facility] PHYSICIANS [place] SPECIALTY [facility] OCCUPATIONAL THERAPY   [address]  [place] [address]  Dept: [phone]  Loc: [phone]   [facility] OT Fax: [phone]      Date:  2024  Initial Evaluation Date: 24     Evaluating Therapist: Zari New OT    Patient Name:  Ena Fields    :  1960    Restrictions/Precautions:  ROM as tolerated , Low fall risk  Diagnosis:  M18.11 (ICD-10-CM) - Arthritis of carpometacarpal (CMC) joint of right thumb                                                               Date of Surgery/Injury: 24 R CMC arthroplasty     Insurance/Certification information:   [place]  Plan of care signed (Y/N): N  Visit# / total visits:      Referring Practitioner:  Dr Wm JENNIFER Hernandez  Specific Practitioner Orders: OT evaluate and treat     Assessment of current deficits   [] Functional mobility             [x] ADLs           [x] Strength                  [] Cognition   [] Functional transfers           [x] IADLs          [] Safety Awareness  [] Endurance   [x] Fine Motor Coordination    [] Balance      [] Vision/perception    [] Sensation     [] Gross Motor Coordination [x] ROM           [x] Pain                        [x] Edema          [x] Scar Adhesion/Skin Integrity      OT PLAN OF CARE   OT POC based on physician orders, patient diagnosis and results of clinical assessment     Frequency/Duration: 2x/ week x 6 weeks     Specific OT Treatment to include:   [x] Instruction in HEP                   Modalities:  [x] Therapeutic Exercise                 [x] Ultrasound               [] Electrical Stimulation/Attended  [x] PROM/Stretching                    [x] Fluidotherapy          [x]  Paraffin                   [x] AAROM  [x] AROM                 [] Iontophoresis:   [] Tendon Glides                                               [] Neuromuscular Re-Ed            [] ADL/IADL re-training

## 2024-07-26 ENCOUNTER — TREATMENT (OUTPATIENT)
Dept: OCCUPATIONAL THERAPY | Age: 64
End: 2024-07-26
Payer: OTHER GOVERNMENT

## 2024-07-26 DIAGNOSIS — M18.11 PRIMARY OSTEOARTHRITIS OF FIRST CARPOMETACARPAL JOINT OF RIGHT HAND: Primary | ICD-10-CM

## 2024-07-26 PROCEDURE — 97140 MANUAL THERAPY 1/> REGIONS: CPT | Performed by: OCCUPATIONAL THERAPIST

## 2024-07-26 PROCEDURE — 97110 THERAPEUTIC EXERCISES: CPT | Performed by: OCCUPATIONAL THERAPIST

## 2024-07-26 PROCEDURE — 97530 THERAPEUTIC ACTIVITIES: CPT | Performed by: OCCUPATIONAL THERAPIST

## 2024-07-26 PROCEDURE — 97018 PARAFFIN BATH THERAPY: CPT | Performed by: OCCUPATIONAL THERAPIST

## 2024-07-26 NOTE — PROGRESS NOTES
Wrist PROM X  - very tight and sore in the volar forearm--> improving    Scar Mass/Edema Control:     Scar massage X  -to mobilize tissue, desensitize    Soft tissue X    -To thumb, thenar and volar forearm  -Exposure to manual techniques and towel texture x 5 min to reduce sensitivity   Strengthening:               Other:     Comfort cool brace X  Pt fit with a medium comfort cool brace to support the CMC while actively advancing her activity at home- use is only as needed.    HEP  - thumb IP blocking ex  - thumb radial ext/ ABD, progressive opposition  - tenodesis     Assessment/Comments: Pt Is feeling better today than in last session and is starting to use her hand more for light tasks. ROM, stretches, FM and functional activities continued today. Status is updated to show progress in all areas. Will continue current POC.      R UE Distal AROM  7-8-24 7-26-24    Wrist flexion  0-46*  0-57*    Wrist ext  0-50*  0- 55    RD  0-2*  0-5*    UD  0-25*  0- 42   Supination 0-30*  full    Pronation  Full  full       Thumb palmar ABD  0-45*  0-49*    Thumb radial ext  25*- 45*   0 -57*    IP  0-27*  0-42    MP  0-10*  0-35    Opposition  Intact to #2 with 6/10 pain  Intact to index and very close to MF       R hand DPC (cm)  7-8-24 7/26/24    Index 1 0    Middle .5 0    Ring .5 0    Small 1 0       -Rehab Potential: Good   -Patient Response to Treatment: Good session.     Patient. Education:  [x] Plans/Goals, Risks/Benefits discussed  [] Home exercise program  Method of Education: [x] Verbal  [x] Demo  [] Written  Comprehension of Education:  [x] Verbalizes understanding.  [x] Demonstrates understanding.  [] Needs Review.  [] Demonstrates/verbalizes understanding of HEP/Ed previously given.      Time In: 1000            Time Out: 1100        CODE  Minutes  Units   28259 Fluidotherapy     50854 Paraffin 10 1   61198 Ultrasound     04209 Electrical Stim - Attended     46193 Iontophoresis     36479 Therapeutic Ex 20 1

## 2024-07-27 ENCOUNTER — APPOINTMENT (OUTPATIENT)
Dept: CT IMAGING | Age: 64
End: 2024-07-27
Payer: OTHER GOVERNMENT

## 2024-07-27 ENCOUNTER — HOSPITAL ENCOUNTER (EMERGENCY)
Age: 64
Discharge: HOME | End: 2024-07-27
Attending: EMERGENCY MEDICINE
Payer: OTHER GOVERNMENT

## 2024-07-27 VITALS
DIASTOLIC BLOOD PRESSURE: 73 MMHG | OXYGEN SATURATION: 96 % | HEART RATE: 86 BPM | RESPIRATION RATE: 16 BRPM | SYSTOLIC BLOOD PRESSURE: 119 MMHG | TEMPERATURE: 97.4 F

## 2024-07-27 DIAGNOSIS — R51.9 ACUTE NONINTRACTABLE HEADACHE, UNSPECIFIED HEADACHE TYPE: Primary | ICD-10-CM

## 2024-07-27 PROCEDURE — 99284 EMERGENCY DEPT VISIT MOD MDM: CPT

## 2024-07-27 PROCEDURE — 2580000003 HC RX 258: Performed by: EMERGENCY MEDICINE

## 2024-07-27 PROCEDURE — 6360000002 HC RX W HCPCS: Performed by: EMERGENCY MEDICINE

## 2024-07-27 PROCEDURE — 70450 CT HEAD/BRAIN W/O DYE: CPT

## 2024-07-27 PROCEDURE — 96374 THER/PROPH/DIAG INJ IV PUSH: CPT

## 2024-07-27 PROCEDURE — 96375 TX/PRO/DX INJ NEW DRUG ADDON: CPT

## 2024-07-27 RX ORDER — 0.9 % SODIUM CHLORIDE 0.9 %
1000 INTRAVENOUS SOLUTION INTRAVENOUS ONCE
Status: COMPLETED | OUTPATIENT
Start: 2024-07-27 | End: 2024-07-27

## 2024-07-27 RX ORDER — DIPHENHYDRAMINE HYDROCHLORIDE 50 MG/ML
25 INJECTION INTRAMUSCULAR; INTRAVENOUS ONCE
Status: COMPLETED | OUTPATIENT
Start: 2024-07-27 | End: 2024-07-27

## 2024-07-27 RX ORDER — METOCLOPRAMIDE HYDROCHLORIDE 5 MG/ML
10 INJECTION INTRAMUSCULAR; INTRAVENOUS ONCE
Status: COMPLETED | OUTPATIENT
Start: 2024-07-27 | End: 2024-07-27

## 2024-07-27 RX ORDER — KETOROLAC TROMETHAMINE 15 MG/ML
15 INJECTION, SOLUTION INTRAMUSCULAR; INTRAVENOUS ONCE
Status: COMPLETED | OUTPATIENT
Start: 2024-07-27 | End: 2024-07-27

## 2024-07-27 RX ADMIN — METOCLOPRAMIDE 10 MG: 5 INJECTION, SOLUTION INTRAMUSCULAR; INTRAVENOUS at 13:43

## 2024-07-27 RX ADMIN — SODIUM CHLORIDE 1000 ML: 9 INJECTION, SOLUTION INTRAVENOUS at 13:43

## 2024-07-27 RX ADMIN — KETOROLAC TROMETHAMINE 15 MG: 15 INJECTION, SOLUTION INTRAMUSCULAR; INTRAVENOUS at 13:43

## 2024-07-27 RX ADMIN — DIPHENHYDRAMINE HYDROCHLORIDE 25 MG: 50 INJECTION INTRAMUSCULAR; INTRAVENOUS at 13:42

## 2024-07-27 ASSESSMENT — LIFESTYLE VARIABLES
HOW OFTEN DO YOU HAVE A DRINK CONTAINING ALCOHOL: NEVER
HOW MANY STANDARD DRINKS CONTAINING ALCOHOL DO YOU HAVE ON A TYPICAL DAY: PATIENT DOES NOT DRINK

## 2024-07-27 NOTE — ED PROVIDER NOTES
[facility] EMERGENCY DEPARTMENT  EMERGENCY DEPARTMENT ENCOUNTER        Pt Name: Ena Fields  MRN: [id number]  Birthdate 1960  Date of evaluation: 7/27/2024  Provider: Patel Perry DO  PCP: Ventura Alvarado DO  Note Started: 12:24 PM EDT 7/27/24    CHIEF COMPLAINT       Chief Complaint   Patient presents with    Headache     Denies hx of migraines, onset yesterday, reports intense throbbing in back of head       HISTORY OF PRESENT ILLNESS: 1 or more Elements   History From: Patient    Limitations to history : None    Ena Fields is a 63 y.o. female who presents to the ED for evaluation of headache.  No history of migraines.  No prior history of similar headache.  States that this headache is been present for the past couple days.  Describes as a throbbing sensation in the back of her head.  She also reports having neck pain.  She has been using her left upper extremity more than usual after having surgery on her right hand.  She also has prior history of surgery on the left shoulder.  She states that she has been having a lot of discomfort in her left shoulder that travels up into her neck.  No fever or chills.  No numbness or weakness in the upper or lower extremities.  No change in her vision recently.  Has been taking over-the-counter ibuprofen which only provides mild relief.  No associated nausea, vomiting, or diarrhea.  No difficulty ambulating.    Nursing Notes were all reviewed and agreed with or any disagreements were addressed in the HPI.      REVIEW OF EXTERNAL NOTE :        REVIEW OF SYSTEMS :           Positives and Pertinent negatives as per HPI.     SURGICAL HISTORY     Past Surgical History:   Procedure Laterality Date    APPENDECTOMY      ARTERY BIOPSY  03/06/2012    left temporal BX    BICEPS TENDON REPAIR Left 05/2012    left shoulder tendon repair by dr gabriel at [facility]    BREAST SURGERY Bilateral

## 2024-07-29 ENCOUNTER — TREATMENT (OUTPATIENT)
Dept: OCCUPATIONAL THERAPY | Age: 64
End: 2024-07-29
Payer: OTHER GOVERNMENT

## 2024-07-29 DIAGNOSIS — M18.11 PRIMARY OSTEOARTHRITIS OF FIRST CARPOMETACARPAL JOINT OF RIGHT HAND: Primary | ICD-10-CM

## 2024-07-29 PROCEDURE — 97530 THERAPEUTIC ACTIVITIES: CPT | Performed by: OCCUPATIONAL THERAPY ASSISTANT

## 2024-07-29 PROCEDURE — 97022 WHIRLPOOL THERAPY: CPT | Performed by: OCCUPATIONAL THERAPY ASSISTANT

## 2024-07-29 PROCEDURE — 97110 THERAPEUTIC EXERCISES: CPT | Performed by: OCCUPATIONAL THERAPY ASSISTANT

## 2024-07-29 PROCEDURE — 97140 MANUAL THERAPY 1/> REGIONS: CPT | Performed by: OCCUPATIONAL THERAPY ASSISTANT

## 2024-07-29 NOTE — PROGRESS NOTES
PROM/Stretching:     Hand PROM X  X  - all digits and thumb as tolerated  - gentle thumb flexion stretch- tolerated well    Wrist PROM X  - very tight and sore in the volar forearm--> improving    Scar Mass/Edema Control:     Scar massage X  -to mobilize tissue, desensitize    Soft tissue X    -To thumb, thenar and volar forearm  -Exposure to manual techniques and towel texture x 5 min to reduce sensitivity   Strengthening:               Other:     Comfort cool brace X  Pt fit with a medium comfort cool brace to support the CMC while actively advancing her activity at home- use is only as needed.    HEP       x - thumb IP blocking ex  - thumb radial ext/ ABD, progressive opposition  - tenodesis  -kinesiotaping - to decrease pain and provide added support.      Assessment/Comments: Pt arrived with increased pain today due to catching her thumb on piece of clothing. Pt. Was sensitive to all gentle PROM ex's of thumb and wrist today. Focused on AROM, prehension and pain management today. Trial of kinesiotaping today. Pt. To report back next session if she benefited from the kinesiotape. Will continue to advance as tolerated.     -Rehab Potential: Good   -Patient Response to Treatment: Good session.     Patient. Education:  [x] Plans/Goals, Risks/Benefits discussed  [] Home exercise program  Method of Education: [x] Verbal  [x] Demo  [] Written  Comprehension of Education:  [x] Verbalizes understanding.  [x] Demonstrates understanding.  [] Needs Review.  [] Demonstrates/verbalizes understanding of HEP/Ed previously given.      Time In: 1000            Time Out: 1100        CODE  Minutes  Units   21483 Fluidotherapy 10 1   30632 Paraffin     83144 Ultrasound     02026 Electrical Stim - Attended     99849 Iontophoresis     58565 Therapeutic Ex 20 1   29860 Therapeutic Activity 15 1   31894 Neuromuscular Re-Ed     74483 Manual Therapy 15 1   26715 ADL/COMP Tech Train     29074 Orthotic Management/Training      Other

## 2024-08-05 ENCOUNTER — TREATMENT (OUTPATIENT)
Dept: OCCUPATIONAL THERAPY | Age: 64
End: 2024-08-05
Payer: OTHER GOVERNMENT

## 2024-08-05 DIAGNOSIS — M18.11 PRIMARY OSTEOARTHRITIS OF FIRST CARPOMETACARPAL JOINT OF RIGHT HAND: Primary | ICD-10-CM

## 2024-08-05 PROCEDURE — 97018 PARAFFIN BATH THERAPY: CPT | Performed by: OCCUPATIONAL THERAPIST

## 2024-08-05 PROCEDURE — 97110 THERAPEUTIC EXERCISES: CPT | Performed by: OCCUPATIONAL THERAPIST

## 2024-08-05 PROCEDURE — 97530 THERAPEUTIC ACTIVITIES: CPT | Performed by: OCCUPATIONAL THERAPIST

## 2024-08-05 PROCEDURE — 97140 MANUAL THERAPY 1/> REGIONS: CPT | Performed by: OCCUPATIONAL THERAPIST

## 2024-08-05 NOTE — PROGRESS NOTES
Wrist PROM X  - very tight and sore in the volar forearm--> improving    Scar Mass/Edema Control:     Scar massage X  -to mobilize tissue, desensitize    Soft tissue X    -To thumb, thenar and volar forearm  -Exposure to manual techniques and towel texture x 5 min to reduce sensitivity   Strengthening:               Other:     Comfort cool brace X  Pt fit with a medium comfort cool brace to support the CMC while actively advancing her activity at home- use is only as needed.    HEP       x - thumb IP blocking ex  - thumb radial ext/ ABD, progressive opposition  - tenodesis  - passive stretches to thumb and wrist  -kinesiotaping - to decrease pain and provide added support.      Assessment/Comments: Pt is very stiff and guarded at Tx start. ROM in the thumb and functional use of the hand improved after exercises to ^ mobility. Pt was reminded to use the thumb more during the day and to avoid posturing/ guarding. Pt stated she is now more aware and will try to improve. ROm in the clinic is better with less discomfort/ just tightness. Will continue.       -Rehab Potential: Good   -Patient Response to Treatment: Pt is pleased with her progress in the clinic today. .     Patient. Education:  [x] Plans/Goals, Risks/Benefits discussed  [x] Home exercise program  Method of Education: [x] Verbal  [x] Demo  [] Written  Comprehension of Education:  [x] Verbalizes understanding.  [x] Demonstrates understanding.  [] Needs Review.  [] Demonstrates/verbalizes understanding of HEP/Ed previously given.      Time In: 1200            Time Out: 1300        CODE  Minutes  Units   23757 Fluidotherapy     73530 Paraffin 10 1   84309 Ultrasound 5 0   89390 Electrical Stim - Attended     47971 Iontophoresis     47930 Therapeutic Ex 20 1   37325 Therapeutic Activity 10 1   54883 Neuromuscular Re-Ed     75691 Manual Therapy 15 1   34451 ADL/COMP Tech Train     27148 Orthotic Management/Training      Other                 Total  60 4

## 2024-08-12 ENCOUNTER — TREATMENT (OUTPATIENT)
Dept: OCCUPATIONAL THERAPY | Age: 64
End: 2024-08-12
Payer: OTHER GOVERNMENT

## 2024-08-12 DIAGNOSIS — M18.11 PRIMARY OSTEOARTHRITIS OF FIRST CARPOMETACARPAL JOINT OF RIGHT HAND: Primary | ICD-10-CM

## 2024-08-12 PROCEDURE — 97140 MANUAL THERAPY 1/> REGIONS: CPT | Performed by: OCCUPATIONAL THERAPIST

## 2024-08-12 PROCEDURE — 97530 THERAPEUTIC ACTIVITIES: CPT | Performed by: OCCUPATIONAL THERAPIST

## 2024-08-12 PROCEDURE — 97110 THERAPEUTIC EXERCISES: CPT | Performed by: OCCUPATIONAL THERAPIST

## 2024-08-12 PROCEDURE — 97018 PARAFFIN BATH THERAPY: CPT | Performed by: OCCUPATIONAL THERAPIST

## 2024-08-12 NOTE — PROGRESS NOTES
OCCUPATIONAL THERAPY DAILY NOTE/ UPDATE  [facility] PHYSICIANS [place] [facility] OCCUPATIONAL THERAPY   [address]  [place] [address]  Dept: [phone]  Loc: [phone]   [facility] OT Fax: [phone]      Date:  2024  Initial Evaluation Date: 24    Evaluating Therapist: Lawanda Conner OT    Patient Name:  Ena Fields    :  1960    Restrictions/Precautions:  ROM as tolerated , Low fall risk  Diagnosis:  M18.11 (ICD-10-CM) - Arthritis of carpometacarpal (CMC) joint of right thumb                                                               Date of Surgery/Injury: 24 R CMC arthroplasty     Insurance/Certification information:   [place]  Plan of care signed (Y/N): Y through 10/8/24  Visit# / total visits:   Referring Practitioner:  Dr Wm JENNIFER Hernandez  Specific Practitioner Orders: OT evaluate and treat     Assessment of current deficits   [] Functional mobility             [x] ADLs           [x] Strength                  [] Cognition   [] Functional transfers           [x] IADLs          [] Safety Awareness  [] Endurance   [x] Fine Motor Coordination    [] Balance      [] Vision/perception    [] Sensation     [] Gross Motor Coordination [x] ROM           [x] Pain                        [x] Edema          [x] Scar Adhesion/Skin Integrity      OT PLAN OF CARE   OT POC based on physician orders, patient diagnosis and results of clinical assessment     Frequency/Duration: 2x/ week x 6 weeks     Specific OT Treatment to include:   [x] Instruction in HEP                   Modalities:  [x] Therapeutic Exercise                 [x] Ultrasound               [] Electrical Stimulation/Attended  [x] PROM/Stretching                    [x] Fluidotherapy          [x]  Paraffin                   [x] AAROM  [x] AROM                 [] Iontophoresis:   [] Tendon Glides                                               [] Neuromuscular Re-Ed            [x]

## 2024-08-20 ENCOUNTER — OFFICE VISIT (OUTPATIENT)
Dept: ORTHOPEDIC SURGERY | Age: 64
End: 2024-08-20
Payer: OTHER GOVERNMENT

## 2024-08-20 ENCOUNTER — TREATMENT (OUTPATIENT)
Dept: OCCUPATIONAL THERAPY | Age: 64
End: 2024-08-20
Payer: OTHER GOVERNMENT

## 2024-08-20 VITALS — TEMPERATURE: 98 F | HEIGHT: 61 IN | BODY MASS INDEX: 21.71 KG/M2 | WEIGHT: 115 LBS

## 2024-08-20 DIAGNOSIS — M18.11 PRIMARY OSTEOARTHRITIS OF FIRST CARPOMETACARPAL JOINT OF RIGHT HAND: Primary | ICD-10-CM

## 2024-08-20 DIAGNOSIS — M18.11 OSTEOARTHRITIS OF THUMB, RIGHT: Primary | ICD-10-CM

## 2024-08-20 PROCEDURE — 97018 PARAFFIN BATH THERAPY: CPT | Performed by: OCCUPATIONAL THERAPIST

## 2024-08-20 PROCEDURE — 97530 THERAPEUTIC ACTIVITIES: CPT | Performed by: OCCUPATIONAL THERAPIST

## 2024-08-20 PROCEDURE — 99213 OFFICE O/P EST LOW 20 MIN: CPT | Performed by: ORTHOPAEDIC SURGERY

## 2024-08-20 PROCEDURE — 97110 THERAPEUTIC EXERCISES: CPT | Performed by: OCCUPATIONAL THERAPIST

## 2024-08-20 NOTE — PROGRESS NOTES
Chief Complaint   Patient presents with    Hand Pain     Pt is continuing OT. Pt has increased pain with AROM. 10/10 pain with specific motions. Pt described increased swelling in the R thumb and fingers from time to time.          Ena Fields is a 63 y.o. year old  who presents for follow up of right CMC IPA 5/10/24. She had been going to OT and has made some improvements. She has gotten some better since I saw her last. She does get pain with certain movements and has some weakness.     Past Medical History:   Diagnosis Date    Anxiety     w panic attacks    Atypical chest pain     work up neg    Bronchitis     chronic    Chronic fatigue syndrome     COVID-19 12/2023    developed  mono & strep from it    Depression     Fibromyositis     Gall stones     H/O cardiovascular stress test 04/23/2012    OHI     Hiatal hernia     History of blood transfusion     Hypertension     IBS (irritable bowel syndrome)     Mitral valve prolapse     pt denies    Palpitations     d/t anxiety    Peptic ulcer     PONV (postoperative nausea and vomiting)     Schatzki's ring 2013    diganosed by dr stephenson has been having endoscopies done for this    TIA (transient ischemic attack) 2010    diagnosed in [place]   no deficits     Past Surgical History:   Procedure Laterality Date    APPENDECTOMY      ARTERY BIOPSY  03/06/2012    left temporal BX    BICEPS TENDON REPAIR Left 05/2012    left shoulder tendon repair by dr gabriel at [facility]    BREAST SURGERY Bilateral 08/2013    breast augmentation    ENDOSCOPY, COLON, DIAGNOSTIC      w dilation for Schatzki's ring    HAND ARTHROPLASTY Right 5/24/2024    RIGHT HAND CMC INTERPOSITIONAL ARTHROPLASTY WITH LIGAMENT RECONSTRUCTION- 5/10/24 ARTHREX performed by William Gabriel DO at [facility] OR    HAND SURGERY Left     HYSTERECTOMY (CERVIX STATUS UNKNOWN)  2008    partial hystecotomy    SHOULDER ARTHROSCOPY  05/2012    rotator cuff repair subacromial decompression and

## 2024-08-20 NOTE — PROGRESS NOTES
OCCUPATIONAL THERAPY DAILY NOTE/ UPDATE  [facility] PHYSICIANS [place] SPECIALTY CARE [facility] OCCUPATIONAL THERAPY   [address]  [place] [address]  Dept: [phone]  Loc: [phone]   [facility] OT Fax: [phone]      Date:  2024  Initial Evaluation Date: 24    Evaluating Therapist: Lawanda Conner OT    Patient Name:  Ena Fields    :  1960    Restrictions/Precautions:  ROM as tolerated , Low fall risk  Diagnosis:  M18.11 (ICD-10-CM) - Arthritis of carpometacarpal (CMC) joint of right thumb                                                               Date of Surgery/Injury: 24 R CMC arthroplasty     Insurance/Certification information:   [place]  Plan of care signed (Y/N): Y through 10/8/24  Visit# / total visits:   Referring Practitioner:  Dr Wm JENNIFER Hernandez  Specific Practitioner Orders: OT evaluate and treat     Assessment of current deficits   [] Functional mobility             [x] ADLs           [x] Strength                  [] Cognition   [] Functional transfers           [x] IADLs          [] Safety Awareness  [] Endurance   [x] Fine Motor Coordination    [] Balance      [] Vision/perception    [] Sensation     [] Gross Motor Coordination [x] ROM           [x] Pain                        [x] Edema          [x] Scar Adhesion/Skin Integrity      OT PLAN OF CARE   OT POC based on physician orders, patient diagnosis and results of clinical assessment     Frequency/Duration: 2x/ week x 6 weeks     Specific OT Treatment to include:   [x] Instruction in HEP                   Modalities:  [x] Therapeutic Exercise                 [x] Ultrasound               [] Electrical Stimulation/Attended  [x] PROM/Stretching                    [x] Fluidotherapy          [x]  Paraffin                   [x] AAROM  [x] AROM                 [] Iontophoresis:   [] Tendon Glides                                               [] Neuromuscular Re-Ed            [x]

## 2024-09-30 ENCOUNTER — TREATMENT (OUTPATIENT)
Dept: OCCUPATIONAL THERAPY | Age: 64
End: 2024-09-30
Payer: OTHER GOVERNMENT

## 2024-09-30 DIAGNOSIS — M18.11 PRIMARY OSTEOARTHRITIS OF FIRST CARPOMETACARPAL JOINT OF RIGHT HAND: Primary | ICD-10-CM

## 2024-09-30 PROCEDURE — 97140 MANUAL THERAPY 1/> REGIONS: CPT | Performed by: OCCUPATIONAL THERAPIST

## 2024-09-30 PROCEDURE — 97018 PARAFFIN BATH THERAPY: CPT | Performed by: OCCUPATIONAL THERAPIST

## 2024-09-30 PROCEDURE — 97110 THERAPEUTIC EXERCISES: CPT | Performed by: OCCUPATIONAL THERAPIST

## 2024-09-30 NOTE — PROGRESS NOTES
decreased reliance on use of the left hand from frequent to occasional from 100% to 20% or less.  (Progress noted- pt is able to use the R hand for light tasks intermittently during the day but relies on the left frequently)  7) Patient will demonstrate a non-tender/non-adherent scar. (Progress noted- less tenderness is present in the surgical area but the scar is mildly adherent)  8) Patient will report ADL functions as Mod I/I using the right hand including writing, eating and light cooking/ cleaning. (Progress noted)       TODAY'S TREATMENT     Pain Level: 7/10 throbbing  Subjective:  Pt contacted OT a couple weeks ago stating she is having more issues with pain/ swelling in the thumb. She desires to resume OT.   Objective:    Updated POC to be completed by visit 6.    INTERVENTION: COMPLETED: SPECIFICS/COMMENTS:   Modality:     Fluidotherapy- R  - 10 min to decrease joint stiffness/ low heat   Paraffin  x -10 min to increase soft tissue extensibility    US x R wrist x - 5 min to decrease swelling and promote ^ circulation   AROM:     R hand AROM X  X  x  x  x         - thumb IP/MP blocking ex ^20x each  - thumb ABD/ ADD and radial ext^20x each  - towel activity 10x--> 15x digits and ^10x with thumb  -Exercise balls CLW 20x and ^ to CCLW   -Bunchem in hand manipulation  -Retrieve small items and use palm to tip translation to place out of reach to ^ thumb motion  -alt opp with coins- better except to SF  - sponge with numbers for in hand manipulation    R wrist AROM    x  x - tabletop ball roll ex for wrist flex/ ext  -Jux-a-cizer x 8  -AROM wrist all planes x 10 each--> continue at home          PROM/Stretching:     Hand PROM X     Wrist PROM X      Scar Mass/Edema Control:     Scar massage X  -to mobilize tissue, desensitize    Soft tissue X    -To thumb, thenar and volar forearm  -Exposure to manual techniques and towel texture x 5 min to reduce sensitivity   Strengthening:     Hand strengthening        -

## 2025-01-20 ENCOUNTER — TELEPHONE (OUTPATIENT)
Dept: CARDIOLOGY CLINIC | Age: 65
End: 2025-01-20

## 2025-01-20 NOTE — TELEPHONE ENCOUNTER
Pt called saying eye doctor put her on Cosopt eye drop for Glaucoma should she be taking both metoprolol and Cosopt as her blood pressure has been running low she said.   107/53  107/58  95/57  123/68  Please advice

## 2025-02-04 ENCOUNTER — TELEPHONE (OUTPATIENT)
Dept: CARDIOLOGY CLINIC | Age: 65
End: 2025-02-04

## 2025-02-04 NOTE — TELEPHONE ENCOUNTER
Pt phnd states her PCP took her off her metoprolol - pt has been off of it for 1-1/2wks due to her BP being too low.  Pt states she is having an echo on 2/11/25.  Pt has been taking her BP (1/31=am 94/59, pm 112/64; 2/1-am 95/57, pm 106/66; 2/2-am 95/58, pm 119/68; 2/3-am 100/60, pm 125/75; 2/4 am-95/57).  Pt not having any symptoms just a little tired & labs have been good.

## 2025-03-10 ENCOUNTER — OFFICE VISIT (OUTPATIENT)
Dept: CARDIOLOGY CLINIC | Age: 65
End: 2025-03-10

## 2025-03-10 VITALS
HEIGHT: 61 IN | BODY MASS INDEX: 22.58 KG/M2 | DIASTOLIC BLOOD PRESSURE: 80 MMHG | SYSTOLIC BLOOD PRESSURE: 120 MMHG | WEIGHT: 119.6 LBS | RESPIRATION RATE: 16 BRPM | HEART RATE: 68 BPM

## 2025-03-10 DIAGNOSIS — R00.2 PALPITATIONS: Primary | ICD-10-CM

## 2025-03-10 NOTE — PROGRESS NOTES
[facility] Cardiology progress note  Dr. Robert Arellano      Reason for visit: Palpitations  Referring Physician: Ventura Alvarado DO     CHIEF COMPLAINT:   Chief Complaint   Patient presents with    Annual Exam    Results       HISTORY OF PRESENT ILLNESS:   Patient is 64 year old female with history of TIA, mitral valve regurgitation, palpitations, familial hypercholesterolemia with family history for premature coronary artery disease, is here for follow up appointment  Palpitations have been infrequent, patient denies any chest pain, no shortness of breath, no lightheadedness, no dizziness, no pedal edema, no PND, no orthopnea, no syncope, no presyncopal episodes.  Functional capacity is at baseline    Past Medical History:   Diagnosis Date    Anxiety     w panic attacks    Atypical chest pain     work up neg    Bronchitis     chronic    Chronic fatigue syndrome     COVID-19 12/2023    developed  mono & strep from it    Depression     Fibromyositis     Gall stones     H/O cardiovascular stress test 04/23/2012    OHI     Hiatal hernia     History of blood transfusion     Hypertension     IBS (irritable bowel syndrome)     Mitral valve prolapse     pt denies    Palpitations     d/t anxiety    Peptic ulcer     PONV (postoperative nausea and vomiting)     Schatzki's ring 2013    diganosed by dr stephenson has been having endoscopies done for this    TIA (transient ischemic attack) 2010    diagnosed in [place]   no deficits         Past Surgical History:   Procedure Laterality Date    APPENDECTOMY      ARTERY BIOPSY  03/06/2012    left temporal BX    BICEPS TENDON REPAIR Left 05/2012    left shoulder tendon repair by dr gabriel at [facility]    BREAST SURGERY Bilateral 08/2013    breast augmentation    ENDOSCOPY, COLON, DIAGNOSTIC      w dilation for Schatzki's ring    HAND ARTHROPLASTY Right 5/24/2024    RIGHT HAND CMC INTERPOSITIONAL ARTHROPLASTY WITH LIGAMENT RECONSTRUCTION- 5/10/24 ARTHREX performed by William Gabriel

## 2025-04-30 ENCOUNTER — OFFICE VISIT (OUTPATIENT)
Dept: ORTHOPEDIC SURGERY | Age: 65
End: 2025-04-30
Payer: OTHER GOVERNMENT

## 2025-04-30 VITALS — HEIGHT: 61 IN | WEIGHT: 119 LBS | BODY MASS INDEX: 22.47 KG/M2

## 2025-04-30 DIAGNOSIS — M18.11 OSTEOARTHRITIS OF THUMB, RIGHT: Primary | ICD-10-CM

## 2025-04-30 PROCEDURE — 99213 OFFICE O/P EST LOW 20 MIN: CPT

## 2025-04-30 NOTE — PROGRESS NOTES
Chief Complaint   Patient presents with    Hand Pain     Right Hand, Thumb pain         Ena Fields is a 64 y.o. year old  who presents for follow up of right CMC IPA 5/10/24. She stated over the last few months she noticed more pain in her thumb region. She does remember hyperextending it several months ago. Her pain does extend up the entire thumb. She doesn't take anything for pain. She denied any numbness or tingling.    Past Medical History:   Diagnosis Date    Anxiety     w panic attacks    Atypical chest pain     work up neg    Bronchitis     chronic    Chronic fatigue syndrome     COVID-19 12/2023    developed  mono & strep from it    Depression     Fibromyositis     Gall stones     H/O cardiovascular stress test 04/23/2012    OHI     Hiatal hernia     History of blood transfusion     Hypertension     IBS (irritable bowel syndrome)     Mitral valve prolapse     pt denies    Palpitations     d/t anxiety    Peptic ulcer     PONV (postoperative nausea and vomiting)     Schatzki's ring 2013    diganosed by dr stephenson has been having endoscopies done for this    TIA (transient ischemic attack) 2010    diagnosed in [place]   no deficits     Past Surgical History:   Procedure Laterality Date    APPENDECTOMY      ARTERY BIOPSY  03/06/2012    left temporal BX    BICEPS TENDON REPAIR Left 05/2012    left shoulder tendon repair by dr gabriel at [facility]    BREAST SURGERY Bilateral 08/2013    breast augmentation    ENDOSCOPY, COLON, DIAGNOSTIC      w dilation for Schatzki's ring    HAND ARTHROPLASTY Right 5/24/2024    RIGHT HAND CMC INTERPOSITIONAL ARTHROPLASTY WITH LIGAMENT RECONSTRUCTION- 5/10/24 ARTHREX performed by William Gabriel DO at [facility] OR    HAND SURGERY Left     HYSTERECTOMY (CERVIX STATUS UNKNOWN)  2008    partial hystecotomy    SHOULDER ARTHROSCOPY  05/2012    rotator cuff repair subacromial decompression and debridment    UPPER GASTROINTESTINAL ENDOSCOPY      esophageal

## (undated) DEVICE — GLOVE SURG 7 LTX TRIFLEX WIDE FINGER PWDR

## (undated) DEVICE — SOLUTION IV IRRIG POUR BRL 0.9% SODIUM CHL 2F7124

## (undated) DEVICE — SOLUTION SURG PREP ANTIMICROBIAL 4 OZ SKIN WND EXIDINE

## (undated) DEVICE — DRAPE 64X41IN RADIOLOGY C ARM EQUIP STER

## (undated) DEVICE — NEEDLE HYPO 25GA L1.5IN BLU POLYPR HUB S STL REG BVL STR

## (undated) DEVICE — INTENDED FOR TISSUE SEPARATION, AND OTHER PROCEDURES THAT REQUIRE A SHARP SURGICAL BLADE TO PUNCTURE OR CUT.: Brand: BARD-PARKER ® STAINLESS STEEL BLADES

## (undated) DEVICE — 3M™ STERI-STRIP™ REINFORCED ADHESIVE SKIN CLOSURES, R1541, 1/4 IN X 3 IN (6 MM X 75 MM), 3 STRIPS/ENVELOPE: Brand: 3M™ STERI-STRIP™

## (undated) DEVICE — BANDAGE COMPR W4XL108IN WHT LAYERED NO CLSR SYN RUB ESMARCH

## (undated) DEVICE — SLING ARM 9 1/2X20IN L MULTIPAK

## (undated) DEVICE — GAUZE,SPONGE,4"X4",12PLY,STERILE,LF,2'S: Brand: MEDLINE

## (undated) DEVICE — BASIC PACK: Brand: CONVERTORS

## (undated) DEVICE — HOOK LOCK LATEX FREE ELASTIC BANDAGE 2INX5YD

## (undated) DEVICE — GAUZE,SPONGE,4"X4",16PLY,XRAY,STRL,LF: Brand: MEDLINE

## (undated) DEVICE — MEDI-VAC NON-CONDUCTIVE SUCTION TUBING: Brand: CARDINAL HEALTH

## (undated) DEVICE — HOOK LOCK LATEX FREE ELASTIC BANDAGE 3INX5YD

## (undated) DEVICE — PADDING CAST 4 YDX3 IN COTTON NS WBRL

## (undated) DEVICE — HANDLE CVR PATENTED RETENTION DISC STRL LIGHT SHLD

## (undated) DEVICE — PEN: MARKING STD 100/CS: Brand: MEDICAL ACTION INDUSTRIES

## (undated) DEVICE — GOWN SURG XL SMS FAB NONREINFORCED RAGLAN SLV HK LOOP CLSR

## (undated) DEVICE — ELECTRODE NDL 2.8IN COAT VALLEYLAB

## (undated) DEVICE — GLOVE SURG SZ 8 L11.2IN FNGR THK12.7MIL CUF THK9.7MIL BRN

## (undated) DEVICE — 1810 FOAM BLOCK NEEDLE COUNTER: Brand: DEVON

## (undated) DEVICE — TOWEL OR BLUEE 16X26IN ST 8 PACK ORB08 16X26ORTWL

## (undated) DEVICE — 20 ML SYRINGE REGULAR TIP: Brand: MONOJECT

## (undated) DEVICE — DRESSING GZ XRFRM 4X4(25/BX 6BX/CS)

## (undated) DEVICE — APPLICATOR MEDICATED 26 CC SOLUTION HI LT ORNG CHLORAPREP

## (undated) DEVICE — 3M™ STERI-DRAPE™ U-DRAPE, LONG 1019: Brand: STERI-DRAPE™